# Patient Record
Sex: MALE | Race: BLACK OR AFRICAN AMERICAN | URBAN - METROPOLITAN AREA
[De-identification: names, ages, dates, MRNs, and addresses within clinical notes are randomized per-mention and may not be internally consistent; named-entity substitution may affect disease eponyms.]

---

## 2018-02-07 ENCOUNTER — HOSPITAL ENCOUNTER (OUTPATIENT)
Dept: LAB | Age: 53
Discharge: HOME | End: 2018-02-07
Payer: COMMERCIAL

## 2018-02-07 ENCOUNTER — OFFICE VISIT (OUTPATIENT)
Dept: FAMILY MEDICINE CLINIC | Facility: CLINIC | Age: 53
End: 2018-02-07

## 2018-02-07 VITALS
BODY MASS INDEX: 32.6 KG/M2 | WEIGHT: 246 LBS | RESPIRATION RATE: 15 BRPM | SYSTOLIC BLOOD PRESSURE: 148 MMHG | HEART RATE: 66 BPM | HEIGHT: 73 IN | DIASTOLIC BLOOD PRESSURE: 92 MMHG | TEMPERATURE: 97.5 F | OXYGEN SATURATION: 100 %

## 2018-02-07 DIAGNOSIS — Z23 ENCOUNTER FOR IMMUNIZATION: ICD-10-CM

## 2018-02-07 DIAGNOSIS — E78.5 HYPERLIPIDEMIA, UNSPECIFIED HYPERLIPIDEMIA TYPE: ICD-10-CM

## 2018-02-07 DIAGNOSIS — E11.9 TYPE 2 DIABETES MELLITUS WITHOUT COMPLICATION, WITH LONG-TERM CURRENT USE OF INSULIN (HCC): ICD-10-CM

## 2018-02-07 DIAGNOSIS — Z79.4 TYPE 2 DIABETES MELLITUS WITHOUT COMPLICATION, WITH LONG-TERM CURRENT USE OF INSULIN (HCC): ICD-10-CM

## 2018-02-07 DIAGNOSIS — I10 ESSENTIAL HYPERTENSION: ICD-10-CM

## 2018-02-07 DIAGNOSIS — I10 ESSENTIAL HYPERTENSION: Primary | ICD-10-CM

## 2018-02-07 LAB
ALBUMIN SERPL-MCNC: 4.7 G/DL (ref 3.4–5)
ALBUMIN/GLOB SERPL: 1.6 {RATIO} (ref 0.8–1.7)
ALP SERPL-CCNC: 32 U/L (ref 45–117)
ALT SERPL-CCNC: 38 U/L (ref 16–61)
ANION GAP SERPL CALC-SCNC: 7 MMOL/L (ref 3–18)
AST SERPL-CCNC: 47 U/L (ref 15–37)
BILIRUB SERPL-MCNC: 0.4 MG/DL (ref 0.2–1)
BUN SERPL-MCNC: 20 MG/DL (ref 7–18)
BUN/CREAT SERPL: 14 (ref 12–20)
CALCIUM SERPL-MCNC: 9.5 MG/DL (ref 8.5–10.1)
CHLORIDE SERPL-SCNC: 103 MMOL/L (ref 100–108)
CHOLEST SERPL-MCNC: 97 MG/DL
CO2 SERPL-SCNC: 29 MMOL/L (ref 21–32)
CREAT SERPL-MCNC: 1.45 MG/DL (ref 0.6–1.3)
GLOBULIN SER CALC-MCNC: 3 G/DL (ref 2–4)
GLUCOSE SERPL-MCNC: 117 MG/DL (ref 74–99)
HBA1C MFR BLD: 6.5 % (ref 4.2–5.6)
HDLC SERPL-MCNC: 31 MG/DL (ref 40–60)
HDLC SERPL: 3.1 {RATIO} (ref 0–5)
LDLC SERPL CALC-MCNC: 34.4 MG/DL (ref 0–100)
LIPID PROFILE,FLP: ABNORMAL
POTASSIUM SERPL-SCNC: 4.3 MMOL/L (ref 3.5–5.5)
PROT SERPL-MCNC: 7.7 G/DL (ref 6.4–8.2)
SODIUM SERPL-SCNC: 139 MMOL/L (ref 136–145)
TRIGL SERPL-MCNC: 158 MG/DL (ref ?–150)
VLDLC SERPL CALC-MCNC: 31.6 MG/DL

## 2018-02-07 PROCEDURE — 80053 COMPREHEN METABOLIC PANEL: CPT | Performed by: INTERNAL MEDICINE

## 2018-02-07 PROCEDURE — 36415 COLL VENOUS BLD VENIPUNCTURE: CPT | Performed by: INTERNAL MEDICINE

## 2018-02-07 PROCEDURE — 83036 HEMOGLOBIN GLYCOSYLATED A1C: CPT | Performed by: INTERNAL MEDICINE

## 2018-02-07 PROCEDURE — 80061 LIPID PANEL: CPT | Performed by: INTERNAL MEDICINE

## 2018-02-07 RX ORDER — METFORMIN HYDROCHLORIDE 1000 MG/1
TABLET ORAL
Refills: 1 | COMMUNITY
Start: 2017-12-27 | End: 2018-03-30 | Stop reason: SDUPTHER

## 2018-02-07 RX ORDER — SILDENAFIL CITRATE 50 MG/1
TABLET, FILM COATED ORAL
Refills: 3 | COMMUNITY
Start: 2017-11-04 | End: 2018-05-07

## 2018-02-07 RX ORDER — FENOFIBRIC ACID 135 MG/1
CAPSULE, DELAYED RELEASE ORAL
Refills: 0 | COMMUNITY
Start: 2017-11-09 | End: 2018-02-07 | Stop reason: SDUPTHER

## 2018-02-07 RX ORDER — AMLODIPINE BESYLATE 5 MG/1
5 TABLET ORAL DAILY
Qty: 90 TAB | Refills: 1 | Status: SHIPPED | OUTPATIENT
Start: 2018-02-07 | End: 2018-09-03 | Stop reason: SDUPTHER

## 2018-02-07 RX ORDER — VALSARTAN AND HYDROCHLOROTHIAZIDE 320; 25 MG/1; MG/1
TABLET, FILM COATED ORAL
Refills: 1 | COMMUNITY
Start: 2017-12-02 | End: 2018-03-01 | Stop reason: SDUPTHER

## 2018-02-07 RX ORDER — FENOFIBRIC ACID 135 MG/1
CAPSULE, DELAYED RELEASE ORAL
Qty: 90 CAP | Refills: 1 | Status: SHIPPED | OUTPATIENT
Start: 2018-02-07 | End: 2018-09-03 | Stop reason: SDUPTHER

## 2018-02-07 RX ORDER — GLIPIZIDE 2.5 MG/1
TABLET, EXTENDED RELEASE ORAL
Refills: 1 | COMMUNITY
Start: 2017-12-11 | End: 2018-03-01 | Stop reason: SDUPTHER

## 2018-02-07 RX ORDER — INSULIN GLARGINE 100 [IU]/ML
INJECTION, SOLUTION SUBCUTANEOUS
Refills: 1 | COMMUNITY
Start: 2018-01-05 | End: 2019-02-27 | Stop reason: SDUPTHER

## 2018-02-07 RX ORDER — ROSUVASTATIN CALCIUM 5 MG/1
TABLET, COATED ORAL
Refills: 1 | COMMUNITY
Start: 2017-11-27 | End: 2018-03-01 | Stop reason: SDUPTHER

## 2018-02-07 NOTE — MR AVS SNAPSHOT
[address] [address] 
[phone] Patient: Olivia Ness MRN: [id number] :1965 Visit Information Date & Time Provider Department Dept. Phone Encounter #  
 2018  9:00 AM Arlen Ruiz, [address] [phone] [id number] Follow-up Instructions Return in about 3 months (around 2018) for HTN, DM2, Hyperlipidemia. Upcoming Health Maintenance Date Due DTaP/Tdap/Td series (1 - Tdap) 12/15/1988 Influenza Age 5 to Adult 2017 FOBT Q 1 YEAR AGE 50-75 12/15/2017 Allergies as of 2018  Review Complete On: 2018 By: Hugo Garcia No Known Allergies Current Immunizations  Never Reviewed Name Date Influenza Vaccine (Quad) PF  Incomplete,  Incomplete Not reviewed this visit You Were Diagnosed With   
  
 Codes Comments Essential hypertension    -  Primary ICD-10-CM: I10 
ICD-9-CM: 401.9 Type 2 diabetes mellitus without complication, with long-term current use of insulin (HCC)     ICD-10-CM: E11.9, Z79.4 ICD-9-CM: 250.00, V58.67 Hyperlipidemia, unspecified hyperlipidemia type     ICD-10-CM: E78.5 ICD-9-CM: 272.4 Encounter for immunization     ICD-10-CM: P20 ICD-9-CM: V03.89 BMI 32.0-32.9,adult     ICD-10-CM: L23.04 
ICD-9-CM: V85.32 Vitals BP Pulse Temp Resp Height(growth percentile) Weight(growth percentile) (!) 148/92 (BP 1 Location: Left arm, BP Patient Position: Sitting) 66 97.5 °F (36.4 °C) (Oral) 15 6' 1\" (1.854 m) 246 lb (111.6 kg) SpO2 BMI Smoking Status 100% 32.46 kg/m2 Never Smoker Vitals History BMI and BSA Data Body Mass Index Body Surface Area  
 32.46 kg/m 2 2.4 m 2 Preferred Pharmacy Pharmacy Name Phone [address] , [address] [facility] AT [address] [facility] [address] [phone] Your Updated Medication List  
  
   
This list is accurate as of: 18  9:50 AM.  Always use your most recent med list. amLODIPine 5 mg tablet Commonly known as:  [place] Take 1 Tab by mouth daily. diph,pertuss(acel),tetanus vac(PF) 2 Lf-(2.5-5-3-5 mcg)-5Lf/0.5 mL Syrg vaccine Commonly known as:  ADACEL(TDAP ADOLESN/ADULT)(PF)  
0.5 mL by IntraMUSCular route once for 1 dose. fenofibric acid 135 mg capsule Commonly known as:  TRILIPIX ER  
TK ONE C PO  ONCE A DAY  
  
 glipiZIDE SR 2.5 mg CR tablet Commonly known as:  GLUCOTROL XL  
TK 1 T PO  ONCE A DAY WITH FOOD  
  
 LANTUS SOLOSTAR 100 unit/mL (3 mL) Inpn Generic drug:  insulin glargine INJECT 25 UNITS BENEATH THE SKIN ONCE A DAY  
  
 metFORMIN 1,000 mg tablet Commonly known as:  GLUCOPHAGE  
TK 1 T PO  BID OMEGA 3 PO Take  by mouth. rosuvastatin 5 mg tablet Commonly known as:  CRESTOR TK 1 T PO QD  
  
 valsartan-hydroCHLOROthiazide 320-25 mg per tablet Commonly known as:  DIOVAN-HCT TK 1 T PO  QD  
  
 VIAGRA 50 mg tablet Generic drug:  sildenafil citrate TK 1 TO 2 TS PO D - 30 MINUTES PRIOR TO SEXUAL ACTIVITY Prescriptions Printed Refills diph,pertuss,acel,,tetanus vac,PF, (ADACEL,TDAP ADOLESN/ADULT,,PF,) 2 Lf-(2.5-5-3-5 mcg)-5Lf/0.5 mL syrg vaccine 0 Si.5 mL by IntraMUSCular route once for 1 dose. Class: Print Route: IntraMUSCular Prescriptions Sent to Pharmacy Refills  
 fenofibric acid (TRILIPIX ER) 135 mg capsule 1 Sig: TK ONE C PO  ONCE A DAY Class: Normal  
 Pharmacy: [facility] [address] [place] [address]  AT [address] Ph #: [phone]  
 amLODIPine (NORVASC) 5 mg tablet 1 Sig: Take 1 Tab by mouth daily. Class: Normal  
 Pharmacy: [facility] [id number] [place] [address], [address] [place] [address]  AT [address] Ph #: [phone] Route: Oral  
  
We Performed the Following INFLUENZA VIRUS VAC QUAD,SPLIT,PRESV FREE SYRINGE IM [id number] CPT(R)] INFLUENZA VIRUS VAC QUAD,SPLIT,PRESV FREE SYRINGE IM [id number] CPT(R)] Follow-up Instructions Return in about 3 months (around 5/7/2018) for HTN, DM2, Hyperlipidemia. Patient Instructions Vaccine Information Statement Influenza (Flu) Vaccine (Inactivated or Recombinant): What you need to know Many Vaccine Information Statements are available in Citizen of [place] and other languages. See www.immunize.org/vis Hojas de Información Sobre Vacunas están disponibles en Español y en muchos otros idiomas. Visite www.immunize.org/vis 1. Why get vaccinated? Influenza (flu) is a contagious disease that spreads around the [place] every year, usually between October and May. Flu is caused by influenza viruses, and is spread mainly by coughing, sneezing, and close contact. Anyone can get flu. Flu strikes suddenly and can last several days. Symptoms vary by age, but can include: 
 fever/chills  sore throat  muscle aches  fatigue  cough  headache  runny or stuffy nose Flu can also lead to pneumonia and blood infections, and cause diarrhea and seizures in children. If you have a medical condition, such as heart or lung disease, flu can make it worse. Flu is more dangerous for some people. Infants and young children, people 72years of age and older, pregnant women, and people with certain health conditions or a weakened immune system are at greatest risk. Each year thousands of people in the [facility] die from flu, and many more are hospitalized. Flu vaccine can: 
 keep you from getting flu, 
 make flu less severe if you do get it, and 
 keep you from spreading flu to your family and other people. 2. Inactivated and recombinant flu vaccines A dose of flu vaccine is recommended every flu season.  Children 6 months through 6years of age may need two doses during the same flu season. Everyone else needs only one dose each flu season. Some inactivated flu vaccines contain a very small amount of a mercury-based preservative called thimerosal. Studies have not shown thimerosal in vaccines to be harmful, but flu vaccines that do not contain thimerosal are available. There is no live flu virus in flu shots. They cannot cause the flu. There are many flu viruses, and they are always changing. Each year a new flu vaccine is made to protect against three or four viruses that are likely to cause disease in the upcoming flu season. But even when the vaccine doesnt exactly match these viruses, it may still provide some protection Flu vaccine cannot prevent: 
 flu that is caused by a virus not covered by the vaccine, or 
 illnesses that look like flu but are not. It takes about 2 weeks for protection to develop after vaccination, and protection lasts through the flu season. 3. Some people should not get this vaccine Tell the person who is giving you the vaccine:  If you have any severe, life-threatening allergies. If you ever had a life-threatening allergic reaction after a dose of flu vaccine, or have a severe allergy to any part of this vaccine, you may be advised not to get vaccinated. Most, but not all, types of flu vaccine contain a small amount of egg protein.  If you ever had Guillain-Barré Syndrome (also called GBS). Some people with a history of GBS should not get this vaccine. This should be discussed with your doctor.  If you are not feeling well. It is usually okay to get flu vaccine when you have a mild illness, but you might be asked to come back when you feel better. 4. Risks of a vaccine reaction With any medicine, including vaccines, there is a chance of reactions. These are usually mild and go away on their own, but serious reactions are also possible. Most people who get a flu shot do not have any problems with it. Minor problems following a flu shot include:  
 soreness, redness, or swelling where the shot was given  hoarseness  sore, red or itchy eyes  cough  fever  aches  headache  itching  fatigue If these problems occur, they usually begin soon after the shot and last 1 or 2 days. More serious problems following a flu shot can include the following:  There may be a small increased risk of Guillain-Barré Syndrome (GBS) after inactivated flu vaccine. This risk has been estimated at 1 or 2 additional cases per million people vaccinated. This is much lower than the risk of severe complications from flu, which can be prevented by flu vaccine.  Young children who get the flu shot along with pneumococcal vaccine (PCV13) and/or DTaP vaccine at the same time might be slightly more likely to have a seizure caused by fever. Ask your doctor for more information. Tell your doctor if a child who is getting flu vaccine has ever had a seizure. Problems that could happen after any injected vaccine:  People sometimes faint after a medical procedure, including vaccination. Sitting or lying down for about 15 minutes can help prevent fainting, and injuries caused by a fall. Tell your doctor if you feel dizzy, or have vision changes or ringing in the ears.  Some people get severe pain in the shoulder and have difficulty moving the arm where a shot was given. This happens very rarely.  Any medication can cause a severe allergic reaction. Such reactions from a vaccine are very rare, estimated at about 1 in a million doses, and would happen within a few minutes to a few hours after the vaccination. As with any medicine, there is a very remote chance of a vaccine causing a serious injury or death. The safety of vaccines is always being monitored.  For more information, visit: www.cdc.gov/vaccinesafety/ 
 
 5. What if there is a serious reaction? What should I look for?  Look for anything that concerns you, such as signs of a severe allergic reaction, very high fever, or unusual behavior. Signs of a severe allergic reaction can include hives, swelling of the face and throat, difficulty breathing, a fast heartbeat, dizziness, and weakness  usually within a few minutes to a few hours after the vaccination. What should I do?  If you think it is a severe allergic reaction or other emergency that cant wait, call 9-1-1 and get the person to the nearest hospital. Otherwise, call your doctor.  Reactions should be reported to the Vaccine Adverse Event Reporting System (VAERS). Your doctor should file this report, or you can do it yourself through  the VAERS web site at www.vaers. [facility].gov, or by calling 9-925.395.7051. VAERS does not give medical advice. 6. The National Vaccine Injury Compensation Program 
 
The [facility] Vaccine Injury Compensation Program (VICP) is a federal program that was created to compensate people who may have been injured by certain vaccines. Persons who believe they may have been injured by a vaccine can learn about the program and about filing a claim by calling 9-858.272.8560 or visiting the [address] [place] [address] website at www.[facility].gov/vaccinecompensation. There is a time limit to file a claim for compensation. 7. How can I learn more?  Ask your healthcare provider. He or she can give you the vaccine package insert or suggest other sources of information.  Call your local or state health department.  Contact the Centers for Disease Control and Prevention (CDC): 
- Call 5-579.660.6671 (1-800-CDC-INFO) or 
- Visit CDCs website at www.cdc.gov/flu Vaccine Information Statement Inactivated Influenza Vaccine 8/7/2015 
42 LUZ Russ [id number] Department of [facility] and [employer] Centers for Disease Control and Prevention Office Use Only Introducing [facility]! [employer] introduces [employer] patient portal. Now you can access parts of your medical record, email your doctor's office, and request medication refills online. 1. In your internet browser, go to [email or web address] 2. Click on the First Time User? Click Here link in the Sign In box. You will see the New Member Sign Up page. 3. Enter your Kale[employer]oscope Access Code exactly as it appears below. You will not need to use this code after youve completed the sign-up process. If you do not sign up before the expiration date, you must request a new code. · K[employer]oscope Access Code: [email or web address] Expires: 5/8/2018  9:50 AM 
 
4. Enter the last four digits of your Social Security Number (xxxx) and Date of Birth (mm/dd/yyyy) as indicated and click Submit. You will be taken to the next sign-up page. 5. Create a Kaleidoscope ID. This will be your Kaleidoscope login ID and cannot be changed, so think of one that is secure and easy to remember. 6. Create a Kale[employer]oscope password. You can change your password at any time. 7. Enter your Password Reset Question and Answer. This can be used at a later time if you forget your password. 8. Enter your e-mail address. You will receive e-mail notification when new information is available in [address] 9. Click Sign Up. You can now view and download portions of your medical record. 10. Click the Download Summary menu link to download a portable copy of your medical information. If you have questions, please visit the Frequently Asked Questions section of the K[employer]oscope website. Remember, Kaleidoscope is NOT to be used for urgent needs. For medical emergencies, dial 911. Now available from your iPhone and Android! Please provide this summary of care documentation to your next provider. Your primary care clinician is listed as Kenn Aschoff If you have any questions after today's visit, please call [phone].

## 2018-02-07 NOTE — PATIENT INSTRUCTIONS
Vaccine Information Statement    Influenza (Flu) Vaccine (Inactivated or Recombinant): What you need to know    Many Vaccine Information Statements are available in [place] and other languages. See www.immunize.org/vis  Hojas de Información Sobre Vacunas están disponibles en Español y en muchos otros idiomas. Visite www.immunize.org/vis    1. Why get vaccinated? Influenza (flu) is a contagious disease that spreads around the [place] every year, usually between October and May. Flu is caused by influenza viruses, and is spread mainly by coughing, sneezing, and close contact. Anyone can get flu. Flu strikes suddenly and can last several days. Symptoms vary by age, but can include:   fever/chills   sore throat   muscle aches   fatigue   cough   headache    runny or stuffy nose    Flu can also lead to pneumonia and blood infections, and cause diarrhea and seizures in children. If you have a medical condition, such as heart or lung disease, flu can make it worse. Flu is more dangerous for some people. Infants and young children, people 72years of age and older, pregnant women, and people with certain health conditions or a weakened immune system are at greatest risk. Each year thousands of people in the [facility] die from flu, and many more are hospitalized. Flu vaccine can:   keep you from getting flu,   make flu less severe if you do get it, and   keep you from spreading flu to your family and other people. 2. Inactivated and recombinant flu vaccines    A dose of flu vaccine is recommended every flu season. Children 6 months through 6years of age may need two doses during the same flu season. Everyone else needs only one dose each flu season.        Some inactivated flu vaccines contain a very small amount of a mercury-based preservative called thimerosal. Studies have not shown thimerosal in vaccines to be harmful, but flu vaccines that do not contain thimerosal are available. There is no live flu virus in flu shots. They cannot cause the flu. There are many flu viruses, and they are always changing. Each year a new flu vaccine is made to protect against three or four viruses that are likely to cause disease in the upcoming flu season. But even when the vaccine doesnt exactly match these viruses, it may still provide some protection    Flu vaccine cannot prevent:   flu that is caused by a virus not covered by the vaccine, or   illnesses that look like flu but are not. It takes about 2 weeks for protection to develop after vaccination, and protection lasts through the flu season. 3. Some people should not get this vaccine    Tell the person who is giving you the vaccine:     If you have any severe, life-threatening allergies. If you ever had a life-threatening allergic reaction after a dose of flu vaccine, or have a severe allergy to any part of this vaccine, you may be advised not to get vaccinated. Most, but not all, types of flu vaccine contain a small amount of egg protein.  If you ever had Guillain-Barré Syndrome (also called GBS). Some people with a history of GBS should not get this vaccine. This should be discussed with your doctor.  If you are not feeling well. It is usually okay to get flu vaccine when you have a mild illness, but you might be asked to come back when you feel better. 4. Risks of a vaccine reaction    With any medicine, including vaccines, there is a chance of reactions. These are usually mild and go away on their own, but serious reactions are also possible. Most people who get a flu shot do not have any problems with it.      Minor problems following a flu shot include:    soreness, redness, or swelling where the shot was given     hoarseness   sore, red or itchy eyes   cough   fever   aches   headache   itching   fatigue  If these problems occur, they usually begin soon after the shot and last 1 or 2 days. More serious problems following a flu shot can include the following:     There may be a small increased risk of Guillain-Barré Syndrome (GBS) after inactivated flu vaccine. This risk has been estimated at 1 or 2 additional cases per million people vaccinated. This is much lower than the risk of severe complications from flu, which can be prevented by flu vaccine.  Young children who get the flu shot along with pneumococcal vaccine (PCV13) and/or DTaP vaccine at the same time might be slightly more likely to have a seizure caused by fever. Ask your doctor for more information. Tell your doctor if a child who is getting flu vaccine has ever had a seizure. Problems that could happen after any injected vaccine:      People sometimes faint after a medical procedure, including vaccination. Sitting or lying down for about 15 minutes can help prevent fainting, and injuries caused by a fall. Tell your doctor if you feel dizzy, or have vision changes or ringing in the ears.  Some people get severe pain in the shoulder and have difficulty moving the arm where a shot was given. This happens very rarely.  Any medication can cause a severe allergic reaction. Such reactions from a vaccine are very rare, estimated at about 1 in a million doses, and would happen within a few minutes to a few hours after the vaccination. As with any medicine, there is a very remote chance of a vaccine causing a serious injury or death. The safety of vaccines is always being monitored. For more information, visit: www.cdc.gov/vaccinesafety/    5. What if there is a serious reaction? What should I look for?  Look for anything that concerns you, such as signs of a severe allergic reaction, very high fever, or unusual behavior.     Signs of a severe allergic reaction can include hives, swelling of the face and throat, difficulty breathing, a fast heartbeat, dizziness, and weakness  usually within a few minutes to a few hours after the vaccination. What should I do?  If you think it is a severe allergic reaction or other emergency that cant wait, call 9-1-1 and get the person to the nearest hospital. Otherwise, call your doctor.  Reactions should be reported to the Vaccine Adverse Event Reporting System (VAERS). Your doctor should file this report, or you can do it yourself through  the VAERS web site at www.vaers. [facility].gov, or by calling 3-391.821.7389. VAERS does not give medical advice. 6. The National Vaccine Injury Compensation Program    The [facility] Vaccine Injury Compensation Program (VICP) is a federal program that was created to compensate people who may have been injured by certain vaccines. Persons who believe they may have been injured by a vaccine can learn about the program and about filing a claim by calling 2-959.927.2715 or visiting the [employer] website at www.[facility].gov/vaccinecompensation. There is a time limit to file a claim for compensation. 7. How can I learn more?  Ask your healthcare provider. He or she can give you the vaccine package insert or suggest other sources of information.  Call your local or state health department.  Contact the Centers for Disease Control and Prevention (CDC):  - Call 6-924.152.6483 (1-800-CDC-INFO) or  - Visit CDCs website at www.cdc.gov/flu    Vaccine Information Statement   Inactivated Influenza Vaccine   8/7/2015  42 URenetta Hinton Sides [id number]    Department of Health and Human Services  Centers for Disease Control and Prevention    Office Use Only

## 2018-02-07 NOTE — PROGRESS NOTES
Emily Hall is a 50 y.o.  male presents today for office visit for diabetes mellitus, hyperlipidemia and hypertension. Pt is not fasting. Pt is in Room # 4.    1. Have you been to the ER, urgent care clinic? Hospitalized? No    2. Have you seen or consulted any other health care providers outside of the [address] [place] [address] ? Include any pap smears or colon screening. Yes Colonoscopy 9/2017 [address] [place] [address] Maintenance reviewed. Will obtain the records from [address]:  Orders Placed This Encounter    Influenza virus vaccine (QUADRIVALENT PRES FREE SYRINGE)  ([address]342)    REFERRAL TO OPHTHALMOLOGY    REFERRAL TO [address] [place] SARGENT MD/Ml Rucker      Emily Hall is a 50 y.o. male who presents for routine immunizations. He denies any symptoms , reactions or allergies that would exclude them from being immunized today. Risks and adverse reactions were discussed and the VIS was given to them. All questions were addressed. He was observed for 10 min post injection. There were no reactions observed.     [address]

## 2018-02-07 NOTE — PROGRESS NOTES
History:   Joy Allen is a 50 y.o. male presenting today for an initial visit for Establish Care; Diabetes; Hypertension; and Cholesterol Problem    Pt presents today to establish care. History is significant for HTN, DM2, Hypercholesterolemia, and gout. He was previously followed by NP Mallorie Clement for primary care. He reports doing well since being seen by his last pcp. He reports consuming a healthy diet and exercising most days of the week. HTN: He currently takes Diovan HCT. He states that he was also prescribed amlodipine, but has not taken it in 2 months since his prescription ran out. Today BP is 148/92. He denies headache, dizziness, SOB, chest pain, palpitations, orthopnea, pnd, or leg swelling. DM Type 2: Controlled on current regimen of glipizide, metformin, and Lantus. Last A1c 7.0 8/23/17. Home readings have averaged  fasting with postprandial readings of 120-140. He denies blurred vision, polyuria, polydipsia, n/v, or abdominal pain. He is not up to date with DM eye and foot exams. Hypercholesterolemia: Currently taking Crestor and fenofibric acid without adverse effect. H/o gout: No recent flareups. Pt is aware of what foods to avoid to help prevent attacks. Past Medical History:   Diagnosis Date    Diabetes ([address] 01/01/2016    Type II    Gout 01/01/2008    Hypercholesterolemia     Hypertension        History reviewed. No pertinent surgical history. Social History     Social History    Marital status:      Spouse name: N/A    Number of children: N/A    Years of education: 25     Occupational History     Other     Social History Main Topics    Smoking status: Never Smoker    Smokeless tobacco: Never Used    Alcohol use Yes      Comment: rarely    Drug use: No    Sexual activity: Yes     Partners: Female     Birth control/ protection: Condom     Other Topics Concern     Service Yes     [employer]    Blood Transfusions No    Caffeine Concern No    Occupational Exposure No    Hobby Hazards No    Sleep Concern No    Stress Concern No    Weight Concern No    Special Diet Yes    Back Care No    Exercise Yes     6X week    Bike Helmet No    Seat Belt Yes    Self-Exams No     Social History Narrative    No narrative on file       Family History   Problem Relation Age of Onset    Cancer Mother 54    Diabetes Father      Type II    No Known Problems Sister     No Known Problems Maternal Grandmother     Heart Attack Maternal Grandfather     Diabetes Paternal Grandmother     Diabetes Paternal Grandfather     Other Daughter     Other Daughter        No current outpatient prescriptions on file prior to visit. No current facility-administered medications on file prior to visit. No Known Allergies    Review of Systems   Constitutional: Negative. HENT: Negative. Eyes: Negative. Respiratory: Negative. Cardiovascular: Negative. Gastrointestinal: Negative. Genitourinary: Negative. Musculoskeletal: Negative. Skin: Negative. Neurological: Negative. Endo/Heme/Allergies: Negative. Psychiatric/Behavioral: Negative. Objective:   VS:    Visit Vitals    BP (!) 148/92 (BP 1 Location: Left arm, BP Patient Position: Sitting)  Comment: manual    Pulse 66    Temp 97.5 °F (36.4 °C) (Oral)    Resp 15    Ht 6' 1\" (1.854 m)    Wt 246 lb (111.6 kg)    SpO2 100%    BMI 32.46 kg/m2     Physical Exam   Constitutional: He is oriented to person, place, and time. He appears well-developed and well-nourished. HENT:   Head: Normocephalic and atraumatic. Right Ear: External ear normal.   Left Ear: External ear normal.   Nose: Nose normal.   Mouth/Throat: Oropharynx is clear and moist.   Eyes: Conjunctivae and EOM are normal. Pupils are equal, round, and reactive to light. Neck: Normal range of motion. Neck supple.    Cardiovascular: Normal rate, regular rhythm, normal heart sounds and intact distal pulses. Pulmonary/Chest: Effort normal and breath sounds normal.   Abdominal: Soft. Bowel sounds are normal.   Musculoskeletal: He exhibits no edema. Neurological: He is alert and oriented to person, place, and time. Skin: Skin is warm and dry. Psychiatric: He has a normal mood and affect. His behavior is normal.   Nursing note and vitals reviewed. Assessment/ Plan:     Diagnoses and all orders for this visit:    1. Essential hypertension        -     Hasn't been taking amlodipine in last 2 months so will add back to regimen for better control  -     amLODIPine (NORVASC) 5 mg tablet; Take 1 Tab by mouth daily.  -     METABOLIC PANEL, COMPREHENSIVE; Future    2. Type 2 diabetes mellitus without complication, with long-term current use of insulin (HCC)        -     Controlled. Last A1c 7.0        -     Continue current regimen  -     REFERRAL TO OPHTHALMOLOGY  -     METABOLIC PANEL, COMPREHENSIVE; Future  -     HEMOGLOBIN A1C W/O EAG; Future  -     REFERRAL TO PODIATRY    3. Hyperlipidemia, unspecified hyperlipidemia type  -     fenofibric acid (TRILIPIX ER) 135 mg capsule; TK ONE C PO  ONCE A DAY  -     LIPID PANEL; Future    4. Encounter for immunization  -     Influenza virus vaccine (QUADRIVALENT PRES FREE SYRINGE) IM ([id number])  -     diph,pertuss,acel,,tetanus vac,PF, (ADACEL,TDAP ADOLESN/ADULT,,PF,) 2 Lf-(2.5-5-3-5 mcg)-5Lf/0.5 mL syrg vaccine; 0.5 mL by IntraMUSCular route once for 1 dose. 5. BMI 32.0-32.9,adult        -     Continue efforts with lifestyle modification      Health maintenance: Pt reports having colonoscopy last year with Dr. Jacques Humphrey and reports finding of one polyp. PSA screening 0.6 on 8/23/17   I have discussed the diagnosis with the patient and the intended plan as seen in the above orders. The patient verbalized understanding and agrees with the plan.       Follow-up Disposition: Not on [address] [facility], MD

## 2018-03-01 DIAGNOSIS — E78.5 HYPERLIPIDEMIA, UNSPECIFIED HYPERLIPIDEMIA TYPE: Primary | ICD-10-CM

## 2018-03-01 DIAGNOSIS — Z79.4 CONTROLLED TYPE 2 DIABETES MELLITUS WITHOUT COMPLICATION, WITH LONG-TERM CURRENT USE OF INSULIN (HCC): ICD-10-CM

## 2018-03-01 DIAGNOSIS — I10 ESSENTIAL HYPERTENSION: ICD-10-CM

## 2018-03-01 DIAGNOSIS — E11.9 CONTROLLED TYPE 2 DIABETES MELLITUS WITHOUT COMPLICATION, WITH LONG-TERM CURRENT USE OF INSULIN (HCC): ICD-10-CM

## 2018-03-01 RX ORDER — ROSUVASTATIN CALCIUM 5 MG/1
TABLET, COATED ORAL
Qty: 90 TAB | Refills: 1 | Status: SHIPPED | OUTPATIENT
Start: 2018-03-01 | End: 2018-11-09 | Stop reason: SDUPTHER

## 2018-03-01 RX ORDER — VALSARTAN AND HYDROCHLOROTHIAZIDE 320; 25 MG/1; MG/1
TABLET, FILM COATED ORAL
Qty: 90 TAB | Refills: 1 | Status: SHIPPED | OUTPATIENT
Start: 2018-03-01 | End: 2018-08-01 | Stop reason: ALTCHOICE

## 2018-03-01 RX ORDER — GLIPIZIDE 2.5 MG/1
TABLET, EXTENDED RELEASE ORAL
Qty: 90 TAB | Refills: 1 | Status: SHIPPED | OUTPATIENT
Start: 2018-03-01 | End: 2018-10-04 | Stop reason: SDUPTHER

## 2018-03-30 DIAGNOSIS — E11.9 TYPE 2 DIABETES MELLITUS WITHOUT COMPLICATION, WITH LONG-TERM CURRENT USE OF INSULIN (HCC): Primary | ICD-10-CM

## 2018-03-30 DIAGNOSIS — Z79.4 TYPE 2 DIABETES MELLITUS WITHOUT COMPLICATION, WITH LONG-TERM CURRENT USE OF INSULIN (HCC): Primary | ICD-10-CM

## 2018-03-30 RX ORDER — METFORMIN HYDROCHLORIDE 1000 MG/1
TABLET ORAL
Qty: 180 TAB | Refills: 1 | Status: SHIPPED | OUTPATIENT
Start: 2018-03-30 | End: 2018-10-04 | Stop reason: SDUPTHER

## 2018-05-07 ENCOUNTER — OFFICE VISIT (OUTPATIENT)
Dept: FAMILY MEDICINE CLINIC | Facility: CLINIC | Age: 53
End: 2018-05-07

## 2018-05-07 ENCOUNTER — HOSPITAL ENCOUNTER (OUTPATIENT)
Dept: LAB | Age: 53
Discharge: HOME | End: 2018-05-07
Payer: COMMERCIAL

## 2018-05-07 VITALS
TEMPERATURE: 97.9 F | HEART RATE: 63 BPM | WEIGHT: 245 LBS | SYSTOLIC BLOOD PRESSURE: 134 MMHG | RESPIRATION RATE: 13 BRPM | OXYGEN SATURATION: 98 % | DIASTOLIC BLOOD PRESSURE: 83 MMHG | BODY MASS INDEX: 32.47 KG/M2 | HEIGHT: 73 IN

## 2018-05-07 DIAGNOSIS — I10 ESSENTIAL HYPERTENSION: ICD-10-CM

## 2018-05-07 DIAGNOSIS — Z79.4 TYPE 2 DIABETES MELLITUS WITHOUT COMPLICATION, WITH LONG-TERM CURRENT USE OF INSULIN (HCC): Primary | ICD-10-CM

## 2018-05-07 DIAGNOSIS — Z23 ENCOUNTER FOR IMMUNIZATION: ICD-10-CM

## 2018-05-07 DIAGNOSIS — Z79.4 TYPE 2 DIABETES MELLITUS WITHOUT COMPLICATION, WITH LONG-TERM CURRENT USE OF INSULIN (HCC): ICD-10-CM

## 2018-05-07 DIAGNOSIS — E11.9 TYPE 2 DIABETES MELLITUS WITHOUT COMPLICATION, WITH LONG-TERM CURRENT USE OF INSULIN (HCC): ICD-10-CM

## 2018-05-07 DIAGNOSIS — E78.5 HYPERLIPIDEMIA, UNSPECIFIED HYPERLIPIDEMIA TYPE: ICD-10-CM

## 2018-05-07 DIAGNOSIS — E11.9 TYPE 2 DIABETES MELLITUS WITHOUT COMPLICATION, WITH LONG-TERM CURRENT USE OF INSULIN (HCC): Primary | ICD-10-CM

## 2018-05-07 LAB
ALBUMIN SERPL-MCNC: 4.5 G/DL (ref 3.4–5)
ALBUMIN/GLOB SERPL: 1.5 {RATIO} (ref 0.8–1.7)
ALP SERPL-CCNC: 33 U/L (ref 45–117)
ALT SERPL-CCNC: 32 U/L (ref 16–61)
ANION GAP SERPL CALC-SCNC: 12 MMOL/L (ref 3–18)
AST SERPL-CCNC: 35 U/L (ref 15–37)
BILIRUB SERPL-MCNC: 0.3 MG/DL (ref 0.2–1)
BUN SERPL-MCNC: 17 MG/DL (ref 7–18)
BUN/CREAT SERPL: 12 (ref 12–20)
CALCIUM SERPL-MCNC: 9 MG/DL (ref 8.5–10.1)
CHLORIDE SERPL-SCNC: 102 MMOL/L (ref 100–108)
CO2 SERPL-SCNC: 31 MMOL/L (ref 21–32)
CREAT SERPL-MCNC: 1.41 MG/DL (ref 0.6–1.3)
CREAT UR-MCNC: 133.07 MG/DL (ref 30–125)
GLOBULIN SER CALC-MCNC: 3.1 G/DL (ref 2–4)
GLUCOSE SERPL-MCNC: 119 MG/DL (ref 74–99)
HBA1C MFR BLD: 6.5 % (ref 4.2–5.6)
MICROALBUMIN UR-MCNC: 0.8 MG/DL (ref 0–3)
MICROALBUMIN/CREAT UR-RTO: 6 MG/G (ref 0–30)
POTASSIUM SERPL-SCNC: 3.7 MMOL/L (ref 3.5–5.5)
PROT SERPL-MCNC: 7.6 G/DL (ref 6.4–8.2)
SODIUM SERPL-SCNC: 145 MMOL/L (ref 136–145)
TSH SERPL DL<=0.05 MIU/L-ACNC: 2.25 UIU/ML (ref 0.36–3.74)

## 2018-05-07 PROCEDURE — 83036 HEMOGLOBIN GLYCOSYLATED A1C: CPT | Performed by: INTERNAL MEDICINE

## 2018-05-07 PROCEDURE — 36415 COLL VENOUS BLD VENIPUNCTURE: CPT | Performed by: INTERNAL MEDICINE

## 2018-05-07 PROCEDURE — 82043 UR ALBUMIN QUANTITATIVE: CPT | Performed by: INTERNAL MEDICINE

## 2018-05-07 PROCEDURE — 84443 ASSAY THYROID STIM HORMONE: CPT | Performed by: INTERNAL MEDICINE

## 2018-05-07 PROCEDURE — 80053 COMPREHEN METABOLIC PANEL: CPT | Performed by: INTERNAL MEDICINE

## 2018-05-07 NOTE — PROGRESS NOTES
Subjective:   Abraham Mirza is a 50 y.o.  male who presents for follow up of HTN and DM2. HTN: BP has improved since last visit. Today BP is 134/83. Pt is currently taking Diovan HCT and amlodipine 5 mg daily. He is making efforts with DASH diet. He denies headache, dizziness, SOB, chest pain, palpitations, orthopnea, pnd, or leg swelling. DM Type 2: Controlled on current regimen of metformin, glipizide SR and Lantus. Home readings have averaged  fasting and 130 postprandial.  He denies hypoglycemia, blurred vision, n/v, abdominal pain, polyuria, or polydipsia. Hyperlipidemia: He is taking rosuvastatin, fenofibric acid, and omega-3 supplement. LDL is wnl, HDL is not at goal.    Elevated BMI:  BMI is 32.32. Pt is making efforts with lifestyle modification including healthy diet and regular exercise. Review of Systems   Constitutional: Negative. HENT: Negative. Eyes: Negative. Respiratory: Negative. Cardiovascular: Negative. Gastrointestinal: Negative. Genitourinary: Negative. Musculoskeletal: Negative. Skin: Negative. Neurological: Negative. Endo/Heme/Allergies: Negative. Psychiatric/Behavioral: Negative. Current Outpatient Prescriptions on File Prior to Visit   Medication Sig Dispense Refill    metFORMIN (GLUCOPHAGE) 1,000 mg tablet TK 1 T PO   Tab 1    valsartan-hydroCHLOROthiazide (DIOVAN-HCT) 320-25 mg per tablet TK 1 T PO  QD 90 Tab 1    glipiZIDE SR (GLUCOTROL XL) 2.5 mg CR tablet TK 1 T PO  ONCE A DAY WITH FOOD 90 Tab 1    rosuvastatin (CRESTOR) 5 mg tablet TK 1 T PO QD 90 Tab 1    LANTUS SOLOSTAR 100 unit/mL (3 mL) inpn INJECT 25 UNITS BENEATH THE SKIN ONCE A DAY  1    FLAXSEED OIL (OMEGA 3 PO) Take  by mouth.  fenofibric acid (TRILIPIX ER) 135 mg capsule TK ONE C PO  ONCE A DAY 90 Cap 1    amLODIPine (NORVASC) 5 mg tablet Take 1 Tab by mouth daily.  90 Tab 1    VIAGRA 50 mg tablet TK 1 TO 2 TS PO D - 30 MINUTES PRIOR TO SEXUAL ACTIVITY  3     No current facility-administered medications on file prior to visit. Reviewed PmHx, RxHx, FmHx, SocHx, AllgHx and updated and dated in the chart. Nurse notes were reviewed and are correct    Objective:     Vitals:    05/07/18 0909   BP: 134/83   Pulse: 63   Resp: 13   Temp: 97.9 °F (36.6 °C)   TempSrc: Oral   SpO2: 98%   Weight: 245 lb (111.1 kg)   Height: 6' 1\" (1.854 m)     Physical Exam   Constitutional: He is oriented to person, place, and time. He appears well-developed and well-nourished. HENT:   Head: Normocephalic and atraumatic. Mouth/Throat: Oropharynx is clear and moist.   Eyes: Conjunctivae and EOM are normal. Pupils are equal, round, and reactive to light. Neck: Normal range of motion. Neck supple. Cardiovascular: Normal rate, regular rhythm, normal heart sounds and intact distal pulses. Pulmonary/Chest: Effort normal and breath sounds normal.   Abdominal: Soft. Bowel sounds are normal.   Musculoskeletal: Normal range of motion. He exhibits no edema. Neurological: He is alert and oriented to person, place, and time. Skin: Skin is warm and dry. Nursing note and vitals reviewed. Assessment/ Plan:     Diagnoses and all orders for this visit:    1. Type 2 diabetes mellitus without complication, with long-term current use of insulin (HCC)  -     HEMOGLOBIN A1C W/O EAG; Future  -     METABOLIC PANEL, COMPREHENSIVE; Future  -     MICROALBUMIN, UR, RAND W/ MICROALB/CREAT RATIO; Future    2. Essential hypertension  -     METABOLIC PANEL, COMPREHENSIVE; Future    3. Hyperlipidemia, unspecified hyperlipidemia type        -     Continue current medications and efforts with lifestyle modification    4. BMI 32.0-32.9,adult  -     TSH 3RD GENERATION; Future  -     Counseled on lifestyle modification    5. Encounter for immunization  -     pneumococcal 23-valent (PNEUMOVAX 23) 25 mcg/0.5 mL injection; 0.5 mL by IntraMUSCular route once for 1 dose.        I have discussed the diagnosis with the patient and the intended plan as seen in the above orders. The patient verbalized understanding and agrees with the plan.     Follow-up Disposition: Not on [address] [facility], MD

## 2018-05-07 NOTE — MR AVS SNAPSHOT
[address] [facility] 
 
 
 [address] [facility] [phone] [id number] 
[phone] Patient: Estephanie Perkins MRN: [id number] :1965 Visit Information Date & Time Provider Department Dept. Phone Encounter #  
 2018  9:00 AM JERMAN Urbina [facility] [phone] [id number] Follow-up Instructions Return in about 3 months (around 2018) for HTN, DM2, elevated BMI. Your Appointments 2018  9:30 AM  
Follow Up with Robin Tyler MD  
[facility]) Appt Note: Return in about 3 months (around 2018) for HTN, DM2.  
 [address] [place] [phone]  
[address] [place] [address] Upcoming Health Maintenance Date Due MICROALBUMIN Q1 12/15/1977 EYE EXAM RETINAL OR DILATED Q1 12/15/1977 Pneumococcal 19-64 Medium Risk (1 of 1 - PPSV23) 12/15/1986 DTaP/Tdap/Td series (1 - Tdap) 12/15/1988 FOBT Q 1 YEAR AGE 50-75 2018* Influenza Age 5 to Adult 2018 HEMOGLOBIN A1C Q6M 2018 LIPID PANEL Q1 2019 FOOT EXAM Q1 2019 *Topic was postponed. The date shown is not the original due date. Allergies as of 2018  Review Complete On: 2018 By: Triny Herron LPN No Known Allergies Current Immunizations  Reviewed on 2018 Name Date Influenza Vaccine (Quad) PF 2018 10:00 AM  
  
 Not reviewed this visit You Were Diagnosed With   
  
 Codes Comments Type 2 diabetes mellitus without complication, with long-term current use of insulin (HCC)    -  Primary ICD-10-CM: E11.9, Z79.4 ICD-9-CM: 250.00, V58.67 Essential hypertension     ICD-10-CM: I10 
ICD-9-CM: 401.9 Hyperlipidemia, unspecified hyperlipidemia type     ICD-10-CM: E78.5 ICD-9-CM: 272.4 BMI 32.0-32.9,adult     ICD-10-CM: X11.56 
ICD-9-CM: V85.32  Encounter for immunization     ICD-10-CM: L52 
 ICD-9-CM: V03.89 Vitals BP Pulse Temp Resp Height(growth percentile) Weight(growth percentile) 134/83 (BP 1 Location: Left arm, BP Patient Position: Sitting) 63 97.9 °F (36.6 °C) (Oral) 13 6' 1\" (1.854 m) 245 lb (111.1 kg) SpO2 BMI Smoking Status 98% 32.32 kg/m2 Never Smoker Vitals History BMI and BSA Data Body Mass Index Body Surface Area  
 32.32 kg/m 2 2.39 m 2 Preferred Pharmacy Pharmacy Name Phone [facility] [facility], [address] [address] [facility] [address] [phone] Your Updated Medication List  
  
   
This list is accurate as of 18 11:29 AM.  Always use your most recent med list. amLODIPine 5 mg tablet Commonly known as:  Alysa Jose C Take 1 Tab by mouth daily. fenofibric acid 135 mg capsule Commonly known as:  TRILIPIX ER  
TK ONE C PO  ONCE A DAY  
  
 glipiZIDE SR 2.5 mg CR tablet Commonly known as:  GLUCOTROL XL  
TK 1 T PO  ONCE A DAY WITH FOOD  
  
 LANTUS SOLOSTAR U-100 INSULIN 100 unit/mL (3 mL) Inpn Generic drug:  insulin glargine INJECT 25 UNITS BENEATH THE SKIN ONCE A DAY  
  
 metFORMIN 1,000 mg tablet Commonly known as:  GLUCOPHAGE  
TK 1 T PO  BID OMEGA 3 PO Take  by mouth.  
  
 pneumococcal 23-valent 25 mcg/0.5 mL injection Commonly known as:  PNEUMOVAX 23  
0.5 mL by IntraMUSCular route once for 1 dose. rosuvastatin 5 mg tablet Commonly known as:  CRESTOR TK 1 T PO QD  
  
 valsartan-hydroCHLOROthiazide 320-25 mg per tablet Commonly known as:  DIOVAN-HCT TK 1 T PO  QD  
  
 VIAGRA 50 mg tablet Generic drug:  sildenafil citrate TK 1 TO 2 TS PO D - 30 MINUTES PRIOR TO SEXUAL ACTIVITY Prescriptions Printed Refills  
 pneumococcal 23-valent (PNEUMOVAX 23) 25 mcg/0.5 mL injection 0 Si.5 mL by IntraMUSCular route once for 1 dose. Class: Print Route: IntraMUSCular Follow-up Instructions Return in about 3 months (around 8/7/2018) for HTN, DM2, elevated BMI. Introducing [facility]! Anabell Dunbar introduces [employer] patient portal. Now you can access parts of your medical record, email your doctor's office, and request medication refills online. 1. In your internet browser, go to [email or web address] 2. Click on the First Time User? Click Here link in the Sign In box. You will see the New Member Sign Up page. 3. Enter your [employer] Access Code exactly as it appears below. You will not need to use this code after youve completed the sign-up process. If you do not sign up before the expiration date, you must request a new code. · [employer] Access Code: [email or web address] Expires: 5/8/2018 10:50 AM 
 
4. Enter the last four digits of your Social Security Number (xxxx) and Date of Birth (mm/dd/yyyy) as indicated and click Submit. You will be taken to the next sign-up page. 5. Create a [employer] ID. This will be your [employer] login ID and cannot be changed, so think of one that is secure and easy to remember. 6. Create a [employer] password. You can change your password at any time. 7. Enter your Password Reset Question and Answer. This can be used at a later time if you forget your password. 8. Enter your e-mail address. You will receive e-mail notification when new information is available in [address] 9. Click Sign Up. You can now view and download portions of your medical record. 10. Click the Download Summary menu link to download a portable copy of your medical information. If you have questions, please visit the Frequently Asked Questions section of the [employer] website. Remember, [employer] is NOT to be used for urgent needs. For medical emergencies, dial 911. Now available from your iPhone and Android! Please provide this summary of care documentation to your next provider. Your primary care clinician is listed as Griffin Montoya If you have any questions after today's visit, please call [phone].

## 2018-05-07 NOTE — PROGRESS NOTES
Davis Falcon is a 50 y.o.@ presents today for office visit for follow up. Pt is in Room # 4.     1. Have you been to the ER, urgent care clinic since your last visit? Hospitalized since your last visit? No    2. Have you seen or consulted any other health care providers outside of the [facility] since your last visit? Include any pap smears or colon screening. No         Health Maintenance reviewed -defered. Requested Prescriptions     Signed Prescriptions Disp Refills    pneumococcal 23-valent (PNEUMOVAX 23) 25 mcg/0.5 mL injection 0.5 mL 0     Si.5 mL by IntraMUSCular route once for 1 dose.        Visit Vitals    /83 (BP 1 Location: Left arm, BP Patient Position: Sitting)    Pulse 63    Temp 97.9 °F (36.6 °C) (Oral)    Resp 13    Ht 6' 1\" (1.854 m)    Wt 245 lb (111.1 kg)    SpO2 98%    BMI 32.32 kg/m2          Upcoming Appts  No.     VORB:   Orders Placed This Encounter    HEMOGLOBIN A1C W/O EAG    METABOLIC PANEL, COMPREHENSIVE    TSH 3RD GENERATION    MICROALBUMIN, UR, RAND W/ MICROALB/CREAT RATIO    DISCONTD: pneumococcal 23-valent (PNEUMOVAX 23) 25 mcg/0.5 mL injection    pneumococcal 23-valent (PNEUMOVAX 23) 25 mcg/0.5 mL injection    MD Stephen Solis LPN

## 2018-05-21 ENCOUNTER — PATIENT OUTREACH (OUTPATIENT)
Dept: FAMILY MEDICINE CLINIC | Facility: CLINIC | Age: 53
End: 2018-05-21

## 2018-05-21 NOTE — PROGRESS NOTES
health screening:    Colonoscopy done June 28 2017 on its way to Dr. Yuniel Griffith office from Dr. Benja Ernst. To be repeated in 5 yrs. Closed this episode of care.

## 2018-08-01 ENCOUNTER — TELEPHONE (OUTPATIENT)
Dept: FAMILY MEDICINE CLINIC | Facility: CLINIC | Age: 53
End: 2018-08-01

## 2018-08-01 DIAGNOSIS — I10 ESSENTIAL HYPERTENSION: Primary | ICD-10-CM

## 2018-08-01 RX ORDER — LOSARTAN POTASSIUM AND HYDROCHLOROTHIAZIDE 25; 100 MG/1; MG/1
1 TABLET ORAL DAILY
Qty: 90 TAB | Refills: 1 | Status: SHIPPED | OUTPATIENT
Start: 2018-08-01 | End: 2019-02-27 | Stop reason: SDUPTHER

## 2018-08-09 ENCOUNTER — HOSPITAL ENCOUNTER (OUTPATIENT)
Dept: LAB | Age: 53
Discharge: HOME | End: 2018-08-09
Payer: COMMERCIAL

## 2018-08-09 ENCOUNTER — OFFICE VISIT (OUTPATIENT)
Dept: FAMILY MEDICINE CLINIC | Facility: CLINIC | Age: 53
End: 2018-08-09

## 2018-08-09 VITALS
BODY MASS INDEX: 31.65 KG/M2 | HEART RATE: 62 BPM | RESPIRATION RATE: 14 BRPM | TEMPERATURE: 98 F | HEIGHT: 73 IN | OXYGEN SATURATION: 99 % | DIASTOLIC BLOOD PRESSURE: 95 MMHG | WEIGHT: 238.8 LBS | SYSTOLIC BLOOD PRESSURE: 140 MMHG

## 2018-08-09 DIAGNOSIS — E11.21 TYPE 2 DIABETES WITH NEPHROPATHY (HCC): ICD-10-CM

## 2018-08-09 DIAGNOSIS — Z86.39 HISTORY OF VITAMIN D DEFICIENCY: ICD-10-CM

## 2018-08-09 DIAGNOSIS — E78.5 HYPERLIPIDEMIA, UNSPECIFIED HYPERLIPIDEMIA TYPE: ICD-10-CM

## 2018-08-09 DIAGNOSIS — I10 ESSENTIAL HYPERTENSION: Primary | ICD-10-CM

## 2018-08-09 DIAGNOSIS — I10 ESSENTIAL HYPERTENSION: ICD-10-CM

## 2018-08-09 LAB
25(OH)D3 SERPL-MCNC: 14.6 NG/ML (ref 30–100)
ALBUMIN SERPL-MCNC: 4.7 G/DL (ref 3.4–5)
ALBUMIN/GLOB SERPL: 1.6 {RATIO} (ref 0.8–1.7)
ALP SERPL-CCNC: 37 U/L (ref 45–117)
ALT SERPL-CCNC: 41 U/L (ref 16–61)
ANION GAP SERPL CALC-SCNC: 7 MMOL/L (ref 3–18)
AST SERPL-CCNC: 39 U/L (ref 15–37)
BILIRUB SERPL-MCNC: 0.3 MG/DL (ref 0.2–1)
BUN SERPL-MCNC: 16 MG/DL (ref 7–18)
BUN/CREAT SERPL: 13 (ref 12–20)
CALCIUM SERPL-MCNC: 9.6 MG/DL (ref 8.5–10.1)
CHLORIDE SERPL-SCNC: 106 MMOL/L (ref 100–108)
CHOLEST SERPL-MCNC: 104 MG/DL
CO2 SERPL-SCNC: 30 MMOL/L (ref 21–32)
CREAT SERPL-MCNC: 1.26 MG/DL (ref 0.6–1.3)
GLOBULIN SER CALC-MCNC: 3 G/DL (ref 2–4)
GLUCOSE SERPL-MCNC: 101 MG/DL (ref 74–99)
HBA1C MFR BLD: 6.7 % (ref 4.2–5.6)
HDLC SERPL-MCNC: 37 MG/DL (ref 40–60)
HDLC SERPL: 2.8 {RATIO} (ref 0–5)
LDLC SERPL CALC-MCNC: 47.8 MG/DL (ref 0–100)
LIPID PROFILE,FLP: ABNORMAL
POTASSIUM SERPL-SCNC: 4.7 MMOL/L (ref 3.5–5.5)
PROT SERPL-MCNC: 7.7 G/DL (ref 6.4–8.2)
SODIUM SERPL-SCNC: 143 MMOL/L (ref 136–145)
TRIGL SERPL-MCNC: 96 MG/DL (ref ?–150)
VLDLC SERPL CALC-MCNC: 19.2 MG/DL

## 2018-08-09 PROCEDURE — 36415 COLL VENOUS BLD VENIPUNCTURE: CPT | Performed by: INTERNAL MEDICINE

## 2018-08-09 PROCEDURE — 80061 LIPID PANEL: CPT | Performed by: INTERNAL MEDICINE

## 2018-08-09 PROCEDURE — 80053 COMPREHEN METABOLIC PANEL: CPT | Performed by: INTERNAL MEDICINE

## 2018-08-09 PROCEDURE — 82306 VITAMIN D 25 HYDROXY: CPT | Performed by: INTERNAL MEDICINE

## 2018-08-09 PROCEDURE — 83036 HEMOGLOBIN GLYCOSYLATED A1C: CPT | Performed by: INTERNAL MEDICINE

## 2018-08-09 NOTE — PROGRESS NOTES
Nanci Linder is a 50 y.o.@ presents today for office visit for follow up. Pt is in Room # 6.     1. Have you been to the ER, urgent care clinic since your last visit? Hospitalized since your last visit? No    2. Have you seen or consulted any other health care providers outside of the [facility] since your last visit? Include any pap smears or colon screening. No         Health Maintenance reviewed - pt declined.     Requested Prescriptions      No prescriptions requested or ordered in this encounter       Visit Vitals    BP (!) 140/95 (BP 1 Location: Right arm, BP Patient Position: Sitting)    Pulse 62    Temp 98 °F (36.7 °C) (Oral)    Resp 14    Ht 6' 1\" (1.854 m)    Wt 238 lb 12.8 oz (108.3 kg)    SpO2 99%    BMI 31.51 kg/m2          Upcoming Appts  no     VORB: No orders of the defined types were placed in this encounter.   MD Stacey Cr LPN

## 2018-08-09 NOTE — PROGRESS NOTES
Subjective:   Ann Marie Little is a 50 y.o. male who presents for follow up for HTN and DM2. HTN:  Pt was previously taking valsartan-hctz and amlodipine. Recently changed to losartan-hctz due to recent recall of valsartan. He is currently taking amlodipine, but has not obtained prescription for losartan-hctz. BP is 139/96 today. Pt reports that he will  the medication today. He denies headache, dizziness, SOB, chest pain, palpitations, orthopnea, pnd, or leg swelling. Hyperlipidemia: LDL and triglycerides are at goal.  HDL is low. He is taking Crestor, omega-3, and fenofibric acid in addition to making efforts with healthy diet and performing regular exercise. DM Type 2: This is at goal.  Hemoglobin A1c was 6.5 on 5/7/18. He is taking metformin , glipizide, and Lantus. Home glucose readings have been averaging 120 mg/dL. He denies hypoglycemia, blurred vision, n/v, or abdominal pain. He is up to date with DM eye exam.  He will follow up with podiatry for DM foot exam.    Vitamin D deficiency:  Pt reports a history of vitamin D deficiency diagnosed a few years ago. He was prescribed a supplement, but did not take it. Review of Systems   Constitutional: Negative for malaise/fatigue. Eyes: Negative for blurred vision. Respiratory: Negative for shortness of breath. Cardiovascular: Negative for chest pain, palpitations, orthopnea, claudication, leg swelling and PND. Neurological: Negative for dizziness, sensory change and headaches. Current Outpatient Prescriptions on File Prior to Visit   Medication Sig Dispense Refill    losartan-hydroCHLOROthiazide (HYZAAR) 100-25 mg per tablet Take 1 Tab by mouth daily.  Indications: hypertension 90 Tab 1    metFORMIN (GLUCOPHAGE) 1,000 mg tablet TK 1 T PO   Tab 1    glipiZIDE SR (GLUCOTROL XL) 2.5 mg CR tablet TK 1 T PO  ONCE A DAY WITH FOOD 90 Tab 1    rosuvastatin (CRESTOR) 5 mg tablet TK 1 T PO QD 90 Tab 1    LANTUS SOLOSTAR 100 unit/mL (3 mL) inpn INJECT 25 UNITS BENEATH THE SKIN ONCE A DAY  1    FLAXSEED OIL (OMEGA 3 PO) Take  by mouth.  fenofibric acid (TRILIPIX ER) 135 mg capsule TK ONE C PO  ONCE A DAY 90 Cap 1    amLODIPine (NORVASC) 5 mg tablet Take 1 Tab by mouth daily. 90 Tab 1     No current facility-administered medications on file prior to visit. Reviewed PmHx, RxHx, FmHx, SocHx, AllgHx and updated and dated in the chart. Nurse notes were reviewed and are correct    Objective:     Vitals:    08/09/18 0936 08/09/18 0942   BP: (!) 139/96 (!) 140/95   Pulse: 62    Resp: 14    Temp: 98 °F (36.7 °C)    TempSrc: Oral    SpO2: 99%    Weight: 238 lb 12.8 oz (108.3 kg)    Height: 6' 1\" (1.854 m)      Physical Exam   Constitutional: He is oriented to person, place, and time. He appears well-developed and well-nourished. HENT:   Head: Normocephalic and atraumatic. Mouth/Throat: Oropharynx is clear and moist.   Eyes: Conjunctivae and EOM are normal. Pupils are equal, round, and reactive to light. Neck: Normal range of motion. Neck supple. Cardiovascular: Normal rate, regular rhythm, normal heart sounds and intact distal pulses. Pulmonary/Chest: Effort normal and breath sounds normal.   Abdominal: Soft. Bowel sounds are normal.   Musculoskeletal: Normal range of motion. He exhibits no edema. Neurological: He is alert and oriented to person, place, and time. Skin: Skin is warm and dry. Psychiatric: He has a normal mood and affect. Nursing note and vitals reviewed. Assessment/ Plan:     Diagnoses and all orders for this visit:    1. Essential hypertension        -     Mildly elevated today. Pt is taking amlodipine, but has not taken losartan-hctz. Instructed to start medication as soon as possible        -     Counseled on DASH diet  -     METABOLIC PANEL, COMPREHENSIVE; Future    2. Hyperlipidemia, unspecified hyperlipidemia type  -     LIPID PANEL;  Future  -     Continue current regimen and efforts with diet and exercise    3. Type 2 diabetes with nephropathy (HCC)        -     This is at goal.  A1c 6.5        -     Continue current regimen  -     METABOLIC PANEL, COMPREHENSIVE; Future  -     HEMOGLOBIN A1C W/O EAG; Future    4. History of vitamin D deficiency  -     VITAMIN D, 25 HYDROXY; Future    5. BMI 31.0-31.9,adult        -     Weight is improving. Continue diet and exercise       I have discussed the diagnosis with the patient and the intended plan as seen in the above orders. The patient verbalized understanding and agrees with the plan.     Follow-up Disposition: Not on [facility], MD

## 2018-08-09 NOTE — MR AVS SNAPSHOT
[address] [place] [address] [id number] 
[phone] Patient: Maria R Dueñas MRN: [id number] :1965 Visit Information Date & Time Provider Department Dept. Phone Encounter #  
 2018  9:30 AM JERMAN Alvarado [facility] [phone] [id number] Follow-up Instructions Return in about 3 months (around 2018) for HTN, DM. Upcoming Health Maintenance Date Due  
 EYE EXAM RETINAL OR DILATED Q1 12/15/1977 Pneumococcal 19-64 Medium Risk (1 of 1 - PPSV23) 12/15/1986 DTaP/Tdap/Td series (1 - Tdap) 12/15/1988 Influenza Age 5 to Adult 2018 HEMOGLOBIN A1C Q6M 2018 LIPID PANEL Q1 2019 FOOT EXAM Q1 2019 MICROALBUMIN Q1 2019 COLONOSCOPY 2022 Allergies as of 2018  Review Complete On: 2018 By: Megan Villegas LPN No Known Allergies Current Immunizations  Reviewed on 2018 Name Date Influenza Vaccine (Quad) PF 2018 10:00 AM  
  
 Not reviewed this visit You Were Diagnosed With   
  
 Codes Comments Essential hypertension    -  Primary ICD-10-CM: I10 
ICD-9-CM: 401.9 Hyperlipidemia, unspecified hyperlipidemia type     ICD-10-CM: E78.5 ICD-9-CM: 272.4 Type 2 diabetes with nephropathy (HCC)     ICD-10-CM: E11.21 
ICD-9-CM: 250.40, 583.81 History of vitamin D deficiency     ICD-10-CM: Z86.39 
ICD-9-CM: V12.1 BMI 31.0-31.9,adult     ICD-10-CM: Z68.31 
ICD-9-CM: V85.31 Vitals BP Pulse Temp Resp Height(growth percentile) Weight(growth percentile) (!) 140/95 (BP 1 Location: Right arm, BP Patient Position: Sitting) 62 98 °F (36.7 °C) (Oral) 14 6' 1\" (1.854 m) 238 lb 12.8 oz (108.3 kg) SpO2 BMI Smoking Status 99% 31.51 kg/m2 Never Smoker Vitals History BMI and BSA Data Body Mass Index Body Surface Area  
 31.51 kg/m 2 2.36 m 2 Preferred Pharmacy Pharmacy Name Phone [id number], [address] [place] [address] AT [address] [phone] Your Updated Medication List  
  
   
This list is accurate as of 8/9/18 10:11 AM.  Always use your most recent med list. amLODIPine 5 mg tablet Commonly known as:  Az Mendeztiti Take 1 Tab by mouth daily. fenofibric acid 135 mg capsule Commonly known as:  TRILIPIX ER  
TK ONE C PO  ONCE A DAY  
  
 glipiZIDE SR 2.5 mg CR tablet Commonly known as:  GLUCOTROL XL  
TK 1 T PO  ONCE A DAY WITH FOOD  
  
 LANTUS SOLOSTAR U-100 INSULIN 100 unit/mL (3 mL) Inpn Generic drug:  insulin glargine INJECT 25 UNITS BENEATH THE SKIN ONCE A DAY  
  
 losartan-hydroCHLOROthiazide 100-25 mg per tablet Commonly known as:  HYZAAR Take 1 Tab by mouth daily. Indications: hypertension  
  
 metFORMIN 1,000 mg tablet Commonly known as:  GLUCOPHAGE  
TK 1 T PO  BID OMEGA 3 PO Take  by mouth. rosuvastatin 5 mg tablet Commonly known as:  CRESTOR TK 1 T PO QD Follow-up Instructions Return in about 3 months (around 11/9/2018) for HTN, DM. To-Do List   
 08/09/2018 Lab:  HEMOGLOBIN A1C W/O EAG   
  
 08/09/2018 Lab:  LIPID PANEL   
  
 08/09/2018 Lab:  METABOLIC PANEL, COMPREHENSIVE   
  
 08/09/2018 Lab:  VITAMIN D, 25 HYDROXY Introducing \[facility] [place] [facility]\"" & HEALTH SERVICES! Nuvia Llamas introduces [employer] patient portal. Now you can access parts of your medical record, email your doctor's office, and request medication refills online. 1. In your internet browser, go to [email or web address] 2. Click on the First Time User? Click Here link in the Sign In box. You will see the New Member Sign Up page. 3. Enter your Vis[employer] Access Code exactly as it appears below. You will not need to use this code after youve completed the sign-up process. If you do not sign up before the expiration date, you must request a new code. · [employer] Access Code: [email or web address] Expires: 8/24/2018  5:21 AM 
 
4. Enter the last four digits of your Social Security Number (xxxx) and Date of Birth (mm/dd/yyyy) as indicated and click Submit. You will be taken to the next sign-up page. 5. Create a [employer] ID. This will be your [employer] login ID and cannot be changed, so think of one that is secure and easy to remember. 6. Create a [employer] password. You can change your password at any time. 7. Enter your Password Reset Question and Answer. This can be used at a later time if you forget your password. 8. Enter your e-mail address. You will receive e-mail notification when new information is available in [address] 9. Click Sign Up. You can now view and download portions of your medical record. 10. Click the Download Summary menu link to download a portable copy of your medical information. If you have questions, please visit the Frequently Asked Questions section of the [employer] website. Remember, [employer] is NOT to be used for urgent needs. For medical emergencies, dial 911. Now available from your iPhone and Android! Please provide this summary of care documentation to your next provider. Your primary care clinician is listed as Griffin Montoya If you have any questions after today's visit, please call [phone].

## 2018-08-13 ENCOUNTER — TELEPHONE (OUTPATIENT)
Dept: FAMILY MEDICINE CLINIC | Facility: CLINIC | Age: 53
End: 2018-08-13

## 2018-08-13 DIAGNOSIS — E55.9 VITAMIN D DEFICIENCY: Primary | ICD-10-CM

## 2018-08-13 RX ORDER — ERGOCALCIFEROL 1.25 MG/1
50000 CAPSULE ORAL
Qty: 12 CAP | Refills: 0 | Status: SHIPPED | OUTPATIENT
Start: 2018-08-13 | End: 2019-05-28

## 2018-08-13 NOTE — PROGRESS NOTES
Hemoglobin A1c is at goal at 6.7. Triglycerides have improved to wnl. HDL has improved to 37. Vitamin D level is low. Recommend initiation of high dose vitamin D supplement. Prescription sent to pharmacy. Continue current medications and efforts with diet and exercise.

## 2018-11-09 ENCOUNTER — HOSPITAL ENCOUNTER (OUTPATIENT)
Dept: LAB | Age: 53
Discharge: HOME | End: 2018-11-09
Payer: COMMERCIAL

## 2018-11-09 ENCOUNTER — OFFICE VISIT (OUTPATIENT)
Dept: FAMILY MEDICINE CLINIC | Facility: CLINIC | Age: 53
End: 2018-11-09

## 2018-11-09 VITALS
TEMPERATURE: 98.7 F | OXYGEN SATURATION: 97 % | SYSTOLIC BLOOD PRESSURE: 138 MMHG | WEIGHT: 246 LBS | DIASTOLIC BLOOD PRESSURE: 75 MMHG | HEIGHT: 73 IN | HEART RATE: 66 BPM | RESPIRATION RATE: 12 BRPM | BODY MASS INDEX: 32.6 KG/M2

## 2018-11-09 DIAGNOSIS — Z79.4 CONTROLLED TYPE 2 DIABETES MELLITUS WITHOUT COMPLICATION, WITH LONG-TERM CURRENT USE OF INSULIN (HCC): ICD-10-CM

## 2018-11-09 DIAGNOSIS — Z23 ENCOUNTER FOR IMMUNIZATION: Primary | ICD-10-CM

## 2018-11-09 DIAGNOSIS — E55.9 VITAMIN D DEFICIENCY: ICD-10-CM

## 2018-11-09 DIAGNOSIS — E78.5 HYPERLIPIDEMIA, UNSPECIFIED HYPERLIPIDEMIA TYPE: ICD-10-CM

## 2018-11-09 DIAGNOSIS — E11.9 CONTROLLED TYPE 2 DIABETES MELLITUS WITHOUT COMPLICATION, WITH LONG-TERM CURRENT USE OF INSULIN (HCC): ICD-10-CM

## 2018-11-09 LAB
25(OH)D3 SERPL-MCNC: 61.1 NG/ML (ref 30–100)
ANION GAP SERPL CALC-SCNC: 8 MMOL/L (ref 3–18)
BUN SERPL-MCNC: 18 MG/DL (ref 7–18)
BUN/CREAT SERPL: 14 (ref 12–20)
CALCIUM SERPL-MCNC: 9.6 MG/DL (ref 8.5–10.1)
CHLORIDE SERPL-SCNC: 105 MMOL/L (ref 100–108)
CO2 SERPL-SCNC: 29 MMOL/L (ref 21–32)
CREAT SERPL-MCNC: 1.29 MG/DL (ref 0.6–1.3)
GLUCOSE SERPL-MCNC: 114 MG/DL (ref 74–99)
HBA1C MFR BLD: 7.3 % (ref 4.2–5.6)
POTASSIUM SERPL-SCNC: 4.1 MMOL/L (ref 3.5–5.5)
SODIUM SERPL-SCNC: 142 MMOL/L (ref 136–145)

## 2018-11-09 PROCEDURE — 80048 BASIC METABOLIC PNL TOTAL CA: CPT

## 2018-11-09 PROCEDURE — 36415 COLL VENOUS BLD VENIPUNCTURE: CPT

## 2018-11-09 PROCEDURE — 82306 VITAMIN D 25 HYDROXY: CPT

## 2018-11-09 PROCEDURE — 83036 HEMOGLOBIN GLYCOSYLATED A1C: CPT

## 2018-11-09 RX ORDER — ROSUVASTATIN CALCIUM 5 MG/1
TABLET, COATED ORAL
Qty: 90 TAB | Refills: 1 | Status: SHIPPED | OUTPATIENT
Start: 2018-11-09 | End: 2019-02-27 | Stop reason: ALTCHOICE

## 2018-11-09 NOTE — PROGRESS NOTES
Omaira Ramirez is a 50 y.o.@ presents today for office  visit for follow up. Pt is in Room # 4.     1. Have you been to the ER, urgent care clinic since your last visit? Hospitalized since your last visit? No    2. Have you seen or consulted any other health care providers outside of the [address] [place] [address] since your last visit? Include any pap smears or colon screening. No         Health Maintenance reviewed - flu shot given today. shingles prescrption given today. Pt had eye exam four months at [facility]. Requested Prescriptions     Signed Prescriptions Disp Refills    varicella-zoster recombinant, PF, (SHINGRIX, PF,) 50 mcg/0.5 mL susr injection 0.5 mL 0     Si.5 mL by IntraMUSCular route once for 1 dose.  rosuvastatin (CRESTOR) 5 mg tablet 90 Tab 1     Sig: TK 1 T PO QD       Visit Vitals  /75   Pulse 66   Temp 98.7 °F (37.1 °C) (Oral)   Resp 12   Ht 6' 1\" (1.854 m)   Wt 246 lb (111.6 kg)   SpO2 97%   BMI 32.46 kg/m²          Upcoming Appts  No.     VORB:   Orders Placed This Encounter    Influenza virus vaccine (QUADRIVALENT PRES FREE SYRINGE) IM ([id number])    VITAMIN D, 25 HYDROXY    METABOLIC PANEL, BASIC    HEMOGLOBIN A1C W/O EAG    varicella-zoster recombinant, PF, (SHINGRIX, PF,) 50 mcg/0.5 mL susr injection    rosuvastatin (CRESTOR) 5 mg tablet    MD Vianney Lyons LPN  Omaira Ramirez is a 50 y.o. male who presents for routine immunizations. He denies any symptoms , reactions or allergies that would exclude them from being immunized today. Risks and adverse reactions were discussed and the VIS was given to them. All questions were addressed. He was observed for10 min post injection. There were no reactions observed.     Vianney Martinez LPN

## 2018-11-09 NOTE — PROGRESS NOTES
Subjective:   Keiko Medina is a 50 y.o.  male who presents for follow-up of HTN, DM2, HLD and Immunization/Injection (flu shit given )    HTN:  BP is controlled on losartan-hctz and amlodipine. BP is 138/75 today. He denies headache, dizziness, SOB, chest pain, palpitations, orthopnea, pnd, or leg swelling.     Hyperlipidemia: LDL and triglycerides are at goal.  HDL is improving. He is taking Crestor, omega-3, and fenofibric acid in addition to making efforts with healthy diet and performing regular exercise.     DM Type 2: This is at goal.  Hemoglobin A1c was 6.7 on 8/9/18. He is taking metformin , glipizide, and Lantus. Home glucose readings have been averaging 120-125 mg/dL. He denies hypoglycemia, blurred vision, n/v, or abdominal pain. He is up to date with DM eye and foot exams.     Vitamin D deficiency:  Pt recently completed high-dose therapy and will have vitamin D level checked today.       Review of Systems   Constitutional: Negative for chills, diaphoresis, fever, malaise/fatigue and weight loss. Eyes: Negative for blurred vision. Respiratory: Negative for cough and shortness of breath. Cardiovascular: Negative for chest pain, palpitations, orthopnea, claudication, leg swelling and PND. Gastrointestinal: Negative for abdominal pain, blood in stool, constipation, diarrhea, melena, nausea and vomiting. Musculoskeletal: Negative for myalgias. Neurological: Negative for dizziness, tingling, sensory change, weakness and headaches.        Current Outpatient Medications on File Prior to Visit   Medication Sig Dispense Refill    metFORMIN (GLUCOPHAGE) 1,000 mg tablet TAKE 1 TABLET BY MOUTH TWICE DAILY 180 Tab 1    glipiZIDE SR (GLUCOTROL XL) 2.5 mg CR tablet TAKE 1 TABLET BY MOUTH EVERY DAY WITH FOOD 90 Tab 1    amLODIPine (NORVASC) 5 mg tablet TAKE 1 TABLET BY MOUTH DAILY 90 Tab 1    fenofibric acid (TRILIPIX ER) 135 mg capsule TAKE 1 CAPSULE BY MOUTH EVERY DAY 90 Cap 1    ergocalciferol (ERGOCALCIFEROL) 50,000 unit capsule Take 1 Cap by mouth every seven (7) days. 12 Cap 0    losartan-hydroCHLOROthiazide (HYZAAR) 100-25 mg per tablet Take 1 Tab by mouth daily. Indications: hypertension 90 Tab 1    LANTUS SOLOSTAR 100 unit/mL (3 mL) inpn INJECT 25 UNITS BENEATH THE SKIN ONCE A DAY  1    FLAXSEED OIL (OMEGA 3 PO) Take  by mouth. No current facility-administered medications on file prior to visit. Reviewed PmHx, RxHx, FmHx, SocHx, AllgHx and updated and dated in the chart. Nurse notes were reviewed and are correct    Objective:     Vitals:    11/09/18 0946   BP: 138/75   Pulse: 66   Resp: 12   Temp: 98.7 °F (37.1 °C)   TempSrc: Oral   SpO2: 97%   Weight: 246 lb (111.6 kg)   Height: 6' 1\" (1.854 m)     Physical Exam   Constitutional: He is oriented to person, place, and time. He appears well-developed and well-nourished. HENT:   Head: Normocephalic and atraumatic. Mouth/Throat: Oropharynx is clear and moist.   Eyes: Conjunctivae and EOM are normal. Pupils are equal, round, and reactive to light. Neck: Normal range of motion. Neck supple. Cardiovascular: Normal rate, regular rhythm, normal heart sounds and intact distal pulses. Pulmonary/Chest: Effort normal and breath sounds normal.   Abdominal: Soft. Bowel sounds are normal. He exhibits no distension and no mass. There is no tenderness. Musculoskeletal: Normal range of motion. He exhibits no edema. Neurological: He is alert and oriented to person, place, and time. Skin: Skin is warm and dry. Psychiatric: He has a normal mood and affect. Nursing note and vitals reviewed. Assessment/ Plan:     Diagnoses and all orders for this visit:    1. Encounter for immunization  -     INFLUENZA VIRUS VAC QUAD,SPLIT,PRESV FREE SYRINGE IM  -     varicella-zoster recombinant, PF, (SHINGRIX, PF,) 50 mcg/0.5 mL susr injection; 0.5 mL by IntraMUSCular route once for 1 dose.     2. Hyperlipidemia, unspecified hyperlipidemia type  -     rosuvastatin (CRESTOR) 5 mg tablet; TK 1 T PO QD    3. Controlled type 2 diabetes mellitus without complication, with long-term current use of insulin (HCC)  -     METABOLIC PANEL, BASIC; Future  -     HEMOGLOBIN A1C W/O EAG; Future    4. Vitamin D deficiency  -     VITAMIN D, 25 HYDROXY; Future       I have discussed the diagnosis with the patient and the intended plan as seen in the above orders. The patient verbalized understanding and agrees with the plan. Follow-up Disposition:  Return in about 3 months (around 2/9/2019) for HTN, DM2, HLD. Dirk Avina     [address], MD

## 2018-11-09 NOTE — PATIENT INSTRUCTIONS
Vaccine Information Statement    Influenza (Flu) Vaccine (Inactivated or Recombinant): What you need to know    Many Vaccine Information Statements are available in Estonian and other languages. See www.immunize.org/vis  Hojas de Información Sobre Vacunas están disponibles en Español y en muchos otros idiomas. Visite www.immunize.org/vis    1. Why get vaccinated? Influenza (flu) is a contagious disease that spreads around the [place] every year, usually between October and May. Flu is caused by influenza viruses, and is spread mainly by coughing, sneezing, and close contact. Anyone can get flu. Flu strikes suddenly and can last several days. Symptoms vary by age, but can include:   fever/chills   sore throat   muscle aches   fatigue   cough   headache    runny or stuffy nose    Flu can also lead to pneumonia and blood infections, and cause diarrhea and seizures in children. If you have a medical condition, such as heart or lung disease, flu can make it worse. Flu is more dangerous for some people. Infants and young children, people 72years of age and older, pregnant women, and people with certain health conditions or a weakened immune system are at greatest risk. Each year thousands of people in the [facility] die from flu, and many more are hospitalized. Flu vaccine can:   keep you from getting flu,   make flu less severe if you do get it, and   keep you from spreading flu to your family and other people. 2. Inactivated and recombinant flu vaccines    A dose of flu vaccine is recommended every flu season. Children 6 months through 6years of age may need two doses during the same flu season. Everyone else needs only one dose each flu season.        Some inactivated flu vaccines contain a very small amount of a mercury-based preservative called thimerosal. Studies have not shown thimerosal in vaccines to be harmful, but flu vaccines that do not contain thimerosal are available. There is no live flu virus in flu shots. They cannot cause the flu. There are many flu viruses, and they are always changing. Each year a new flu vaccine is made to protect against three or four viruses that are likely to cause disease in the upcoming flu season. But even when the vaccine doesnt exactly match these viruses, it may still provide some protection    Flu vaccine cannot prevent:   flu that is caused by a virus not covered by the vaccine, or   illnesses that look like flu but are not. It takes about 2 weeks for protection to develop after vaccination, and protection lasts through the flu season. 3. Some people should not get this vaccine    Tell the person who is giving you the vaccine:     If you have any severe, life-threatening allergies. If you ever had a life-threatening allergic reaction after a dose of flu vaccine, or have a severe allergy to any part of this vaccine, you may be advised not to get vaccinated. Most, but not all, types of flu vaccine contain a small amount of egg protein.  If you ever had Guillain-Barré Syndrome (also called GBS). Some people with a history of GBS should not get this vaccine. This should be discussed with your doctor.  If you are not feeling well. It is usually okay to get flu vaccine when you have a mild illness, but you might be asked to come back when you feel better. 4. Risks of a vaccine reaction    With any medicine, including vaccines, there is a chance of reactions. These are usually mild and go away on their own, but serious reactions are also possible. Most people who get a flu shot do not have any problems with it.      Minor problems following a flu shot include:    soreness, redness, or swelling where the shot was given     hoarseness   sore, red or itchy eyes   cough   fever   aches   headache   itching   fatigue  If these problems occur, they usually begin soon after the shot and last 1 or 2 days. More serious problems following a flu shot can include the following:     There may be a small increased risk of Guillain-Barré Syndrome (GBS) after inactivated flu vaccine. This risk has been estimated at 1 or 2 additional cases per million people vaccinated. This is much lower than the risk of severe complications from flu, which can be prevented by flu vaccine.  Young children who get the flu shot along with pneumococcal vaccine (PCV13) and/or DTaP vaccine at the same time might be slightly more likely to have a seizure caused by fever. Ask your doctor for more information. Tell your doctor if a child who is getting flu vaccine has ever had a seizure. Problems that could happen after any injected vaccine:      People sometimes faint after a medical procedure, including vaccination. Sitting or lying down for about 15 minutes can help prevent fainting, and injuries caused by a fall. Tell your doctor if you feel dizzy, or have vision changes or ringing in the ears.  Some people get severe pain in the shoulder and have difficulty moving the arm where a shot was given. This happens very rarely.  Any medication can cause a severe allergic reaction. Such reactions from a vaccine are very rare, estimated at about 1 in a million doses, and would happen within a few minutes to a few hours after the vaccination. As with any medicine, there is a very remote chance of a vaccine causing a serious injury or death. The safety of vaccines is always being monitored. For more information, visit: www.cdc.gov/vaccinesafety/    5. What if there is a serious reaction? What should I look for?  Look for anything that concerns you, such as signs of a severe allergic reaction, very high fever, or unusual behavior.     Signs of a severe allergic reaction can include hives, swelling of the face and throat, difficulty breathing, a fast heartbeat, dizziness, and weakness - usually within a few minutes to a few hours after the vaccination. What should I do?  If you think it is a severe allergic reaction or other emergency that cant wait, call 9-1-1 and get the person to the nearest hospital. Otherwise, call your doctor.  Reactions should be reported to the Vaccine Adverse Event Reporting System (VAERS). Your doctor should file this report, or you can do it yourself through  the VAERS web site at www.vaers. [facility].gov, or by calling 7-222.627.9704. VAERS does not give medical advice. 6. The National Vaccine Injury Compensation Program    The [facility] Vaccine Injury Compensation Program (VICP) is a federal program that was created to compensate people who may have been injured by certain vaccines. Persons who believe they may have been injured by a vaccine can learn about the program and about filing a claim by calling 4-972.770.5880 or visiting the [employer] website at www.[facility].gov/vaccinecompensation. There is a time limit to file a claim for compensation. 7. How can I learn more?  Ask your healthcare provider. He or she can give you the vaccine package insert or suggest other sources of information.  Call your local or state health department.  Contact the Centers for Disease Control and Prevention (CDC):  - Call 1-998.338.6142 (1-800-CDC-INFO) or  - Visit CDCs website at www.cdc.gov/flu    Vaccine Information Statement   Inactivated Influenza Vaccine   8/7/2015  [address] 240LE-97    Department of Health and Human Services  Centers for Disease Control and Prevention    Office Use Only

## 2019-02-27 ENCOUNTER — OFFICE VISIT (OUTPATIENT)
Dept: FAMILY MEDICINE CLINIC | Facility: CLINIC | Age: 54
End: 2019-02-27

## 2019-02-27 ENCOUNTER — HOSPITAL ENCOUNTER (OUTPATIENT)
Dept: LAB | Age: 54
Discharge: HOME | End: 2019-02-27
Payer: COMMERCIAL

## 2019-02-27 VITALS
RESPIRATION RATE: 15 BRPM | HEIGHT: 73 IN | TEMPERATURE: 97.3 F | WEIGHT: 242 LBS | OXYGEN SATURATION: 98 % | HEART RATE: 65 BPM | DIASTOLIC BLOOD PRESSURE: 78 MMHG | BODY MASS INDEX: 32.07 KG/M2 | SYSTOLIC BLOOD PRESSURE: 134 MMHG

## 2019-02-27 DIAGNOSIS — E11.21 TYPE 2 DIABETES WITH NEPHROPATHY (HCC): ICD-10-CM

## 2019-02-27 DIAGNOSIS — I10 ESSENTIAL HYPERTENSION: Primary | ICD-10-CM

## 2019-02-27 DIAGNOSIS — E78.5 DYSLIPIDEMIA: ICD-10-CM

## 2019-02-27 DIAGNOSIS — M79.10 MUSCLE SORENESS: ICD-10-CM

## 2019-02-27 LAB
ALBUMIN SERPL-MCNC: 4.6 G/DL (ref 3.4–5)
ALBUMIN/GLOB SERPL: 1.6 {RATIO} (ref 0.8–1.7)
ALP SERPL-CCNC: 40 U/L (ref 45–117)
ALT SERPL-CCNC: 37 U/L (ref 16–61)
ANION GAP SERPL CALC-SCNC: 8 MMOL/L (ref 3–18)
AST SERPL-CCNC: 28 U/L (ref 15–37)
BILIRUB SERPL-MCNC: 0.2 MG/DL (ref 0.2–1)
BUN SERPL-MCNC: 17 MG/DL (ref 7–18)
BUN/CREAT SERPL: 13 (ref 12–20)
CALCIUM SERPL-MCNC: 9.3 MG/DL (ref 8.5–10.1)
CHLORIDE SERPL-SCNC: 105 MMOL/L (ref 100–108)
CHOLEST SERPL-MCNC: 85 MG/DL
CK SERPL-CCNC: 123 U/L (ref 39–308)
CO2 SERPL-SCNC: 27 MMOL/L (ref 21–32)
CREAT SERPL-MCNC: 1.33 MG/DL (ref 0.6–1.3)
GLOBULIN SER CALC-MCNC: 2.9 G/DL (ref 2–4)
GLUCOSE SERPL-MCNC: 164 MG/DL (ref 74–99)
HBA1C MFR BLD: 8 % (ref 4.2–5.6)
HDLC SERPL-MCNC: 26 MG/DL (ref 40–60)
HDLC SERPL: 3.3 {RATIO} (ref 0–5)
LDLC SERPL CALC-MCNC: ABNORMAL MG/DL (ref 0–100)
LIPID PROFILE,FLP: ABNORMAL
POTASSIUM SERPL-SCNC: 4.2 MMOL/L (ref 3.5–5.5)
PROT SERPL-MCNC: 7.5 G/DL (ref 6.4–8.2)
SODIUM SERPL-SCNC: 140 MMOL/L (ref 136–145)
T4 FREE SERPL-MCNC: 1.1 NG/DL (ref 0.7–1.5)
TRIGL SERPL-MCNC: 324 MG/DL (ref ?–150)
TSH SERPL DL<=0.05 MIU/L-ACNC: 2 UIU/ML (ref 0.36–3.74)
VLDLC SERPL CALC-MCNC: ABNORMAL MG/DL

## 2019-02-27 PROCEDURE — 83036 HEMOGLOBIN GLYCOSYLATED A1C: CPT

## 2019-02-27 PROCEDURE — 84439 ASSAY OF FREE THYROXINE: CPT

## 2019-02-27 PROCEDURE — 36415 COLL VENOUS BLD VENIPUNCTURE: CPT

## 2019-02-27 PROCEDURE — 84443 ASSAY THYROID STIM HORMONE: CPT

## 2019-02-27 PROCEDURE — 80053 COMPREHEN METABOLIC PANEL: CPT

## 2019-02-27 PROCEDURE — 82550 ASSAY OF CK (CPK): CPT

## 2019-02-27 PROCEDURE — 80061 LIPID PANEL: CPT

## 2019-02-27 RX ORDER — INSULIN GLARGINE 100 [IU]/ML
INJECTION, SOLUTION SUBCUTANEOUS
Qty: 5 ADJUSTABLE DOSE PRE-FILLED PEN SYRINGE | Refills: 5 | Status: SHIPPED | OUTPATIENT
Start: 2019-02-27 | End: 2020-04-06 | Stop reason: SDUPTHER

## 2019-02-27 RX ORDER — LOSARTAN POTASSIUM AND HYDROCHLOROTHIAZIDE 25; 100 MG/1; MG/1
1 TABLET ORAL DAILY
Qty: 90 TAB | Refills: 1 | Status: SHIPPED | OUTPATIENT
Start: 2019-02-27 | End: 2019-09-18 | Stop reason: SDUPTHER

## 2019-02-27 RX ORDER — PRAVASTATIN SODIUM 20 MG/1
20 TABLET ORAL
Qty: 30 TAB | Refills: 5 | Status: SHIPPED | OUTPATIENT
Start: 2019-02-27 | End: 2019-05-28

## 2019-02-27 NOTE — PROGRESS NOTES
Subjective:   Isidro López is a 48 y.o.  male who presents for follow-up of HTN, DM2, HLD. HTN:  BP is controlled on losartan-hctz and amlodipine. Pt denies headache, dizziness, SOB, chest pain, palpitations, orthopnea, pnd, or leg swelling.     Hyperlipidemia: LDL and triglycerides are at goal.  HDL is a few points below goal at 37. He is taking Crestor, omega-3, and fenofibric acid in addition to making efforts with healthy diet and performing regular exercise.     DM Type 2:  Hemoglobin A1c was 7.3 on 11/9/18. He is taking metformin , glipizide, and Lantus. Home glucose readings have been averaging 120-140 mg/dL. He denies hypoglycemia, blurred vision, n/v, or abdominal pain. He is up to date with DM eye and foot exams.     Muscle soreness:  Pt reports a 3 month history of mild muscle soreness of bilateral shoulders. He denies strenuous activity, trauma, redness, swelling, weakness, tingling/numbness. Pt reports symptoms are very mild, but noticeable.     Review of Systems   Constitutional: Negative for chills, diaphoresis, fever, malaise/fatigue and weight loss. Eyes: Negative for blurred vision. Respiratory: Negative for cough and shortness of breath. Cardiovascular: Negative for chest pain, palpitations, orthopnea, claudication, leg swelling and PND. Gastrointestinal: Negative for abdominal pain, blood in stool, constipation, diarrhea, melena, nausea and vomiting. Musculoskeletal: Positive for myalgias. Neurological: Negative for dizziness, tingling, sensory change, weakness and headaches. Psychiatric/Behavioral: Negative for depression.        Current Outpatient Medications on File Prior to Visit   Medication Sig Dispense Refill    metFORMIN (GLUCOPHAGE) 1,000 mg tablet TAKE 1 TABLET BY MOUTH TWICE DAILY 180 Tab 1    glipiZIDE SR (GLUCOTROL XL) 2.5 mg CR tablet TAKE 1 TABLET BY MOUTH EVERY DAY WITH FOOD 90 Tab 1    amLODIPine (NORVASC) 5 mg tablet TAKE 1 TABLET BY MOUTH DAILY 90 Tab 1    fenofibric acid (TRILIPIX ER) 135 mg capsule TAKE 1 CAPSULE BY MOUTH EVERY DAY 90 Cap 1    FLAXSEED OIL (OMEGA 3 PO) Take  by mouth.  ergocalciferol (ERGOCALCIFEROL) 50,000 unit capsule Take 1 Cap by mouth every seven (7) days. 12 Cap 0     No current facility-administered medications on file prior to visit. Reviewed PmHx, RxHx, FmHx, SocHx, AllgHx and updated and dated in the chart. Nurse notes were reviewed and are correct    Objective:     Vitals:    02/27/19 0909   BP: 134/78   Pulse: 65   Resp: 15   Temp: 97.3 °F (36.3 °C)   TempSrc: Oral   SpO2: 98%   Weight: 242 lb (109.8 kg)   Height: 6' 1\" (1.854 m)     Physical Exam   Constitutional: He is oriented to person, place, and time. He appears well-developed and well-nourished. HENT:   Head: Normocephalic and atraumatic. Mouth/Throat: Oropharynx is clear and moist.   Eyes: Conjunctivae and EOM are normal. Pupils are equal, round, and reactive to light. Neck: Normal range of motion. Neck supple. Cardiovascular: Normal rate, regular rhythm, normal heart sounds and intact distal pulses. Pulmonary/Chest: Effort normal and breath sounds normal.   Abdominal: Soft. Bowel sounds are normal. He exhibits no distension and no mass. There is no tenderness. Musculoskeletal: Normal range of motion. He exhibits no edema. Right shoulder: He exhibits tenderness (mildly TTP of deltoid). He exhibits normal range of motion, no bony tenderness, no swelling, no effusion, no crepitus, no deformity, no laceration, no pain, no spasm, normal pulse and normal strength. Left shoulder: He exhibits tenderness (mildly TTP of deltoid). He exhibits normal range of motion, no bony tenderness, no swelling, no effusion, no crepitus, no deformity, no laceration, no pain, no spasm, normal pulse and normal strength. Neurological: He is alert and oriented to person, place, and time. Skin: Skin is warm and dry.    Psychiatric: He has a normal mood and affect. Nursing note and vitals reviewed. Assessment/ Plan:     Diagnoses and all orders for this visit:    1. Essential hypertension        -     BP is controlled. Continue current antihypertensive medication. Continue efforts with DASH diet. -     losartan-hydroCHLOROthiazide (HYZAAR) 100-25 mg per tablet; Take 1 Tab by mouth daily. 2. Type 2 diabetes with nephropathy (HCC)        -     Hemoglobin a1c 7.3 last check. Continue current medications. -     LANTUS SOLOSTAR U-100 INSULIN 100 unit/mL (3 mL) inpn; INJECT 25 UNITS BENEATH THE SKIN ONCE A DAY  -     HEMOGLOBIN A1C W/O EAG; Future  -     METABOLIC PANEL, COMPREHENSIVE; Future    3. Dyslipidemia  -     LIPID PANEL; Future  -     pravastatin (PRAVACHOL) 20 mg tablet; Take 1 Tab by mouth nightly. 4. Muscle soreness  -     CK; Future  -     TSH 3RD GENERATION; Future  -     T4, FREE; Future    5. BMI 31.0-31.9,adult        -     Weight is improving. Continue efforts with diet and exercise. I have discussed the diagnosis with the patient and the intended plan as seen in the above orders. The patient verbalized understanding and agrees with the plan. Follow-up Disposition:  Return in about 3 months (around 5/27/2019) for HTN, DM, dyslipidemia.     Aguila Waldrop MD

## 2019-02-27 NOTE — PROGRESS NOTES
Cat Chung is a 48 y.o.@ presents today for office visit for follow up. Pt is in Room # 5.     1. Have you been to the ER, urgent care clinic since your last visit? Hospitalized since your last visit? No    2. Have you seen or consulted any other health care providers outside of the [address] [place] [address] since your last visit? Include any pap smears or colon screening. No         Health Maintenance reviewed - up to date. .    Requested Prescriptions     Signed Prescriptions Disp Refills    LANTUS SOLOSTAR U-100 INSULIN 100 unit/mL (3 mL) inpn 5 Adjustable Dose Pre-filled Pen Syringe 5     Sig: INJECT 25 UNITS BENEATH THE SKIN ONCE A DAY    losartan-hydroCHLOROthiazide (HYZAAR) 100-25 mg per tablet 90 Tab 1     Sig: Take 1 Tab by mouth daily.  pravastatin (PRAVACHOL) 20 mg tablet 30 Tab 5     Sig: Take 1 Tab by mouth nightly.        Visit Vitals  /78 (BP 1 Location: Right arm, BP Patient Position: Sitting)   Pulse 65   Temp 97.3 °F (36.3 °C) (Oral)   Resp 15   Ht 6' 1\" (1.854 m)   Wt 242 lb (109.8 kg)   SpO2 98%   BMI 31.93 kg/m²          Upcoming Appts  No.     VORB:   Orders Placed This Encounter    CK    LIPID PANEL    HEMOGLOBIN A1C W/O EAG    METABOLIC PANEL, COMPREHENSIVE    TSH 3RD GENERATION    T4, FREE    LANTUS SOLOSTAR U-100 INSULIN 100 unit/mL (3 mL) inpn    losartan-hydroCHLOROthiazide (HYZAAR) 100-25 mg per tablet    pravastatin (PRAVACHOL) 20 mg tablet    MD Angela Villegasch, LPN

## 2019-03-12 NOTE — PROGRESS NOTES
Hemoglobin a1c has increased to 8.0. Need to be sure to maintain efforts with diabetic diet and all prescribed medications. Lipid panel shows elevated triglycerides. Will need to continue current medications and efforts with healthy diet and exercise. It is unclear if blood sample was fasting. Recommend repeating fasting lipid panel being sure patient has fasted for at least 9-12 hours. Kidney function is stable. Suggest drinking more water. Follow up for regularly scheduled appointment.

## 2019-03-15 NOTE — PROGRESS NOTES
Called pt and unable to leave message. Number no longer in service. The call was to inform pt results    Letter was sent .

## 2019-03-29 DIAGNOSIS — E78.5 HYPERLIPIDEMIA, UNSPECIFIED HYPERLIPIDEMIA TYPE: ICD-10-CM

## 2019-03-29 DIAGNOSIS — I10 ESSENTIAL HYPERTENSION: ICD-10-CM

## 2019-04-01 RX ORDER — AMLODIPINE BESYLATE 5 MG/1
TABLET ORAL
Qty: 90 TAB | Refills: 0 | Status: SHIPPED | OUTPATIENT
Start: 2019-04-01 | End: 2019-05-28

## 2019-04-01 RX ORDER — FENOFIBRIC ACID 135 MG/1
CAPSULE, DELAYED RELEASE ORAL
Qty: 90 CAP | Refills: 0 | Status: SHIPPED | OUTPATIENT
Start: 2019-04-01 | End: 2019-08-13

## 2019-04-02 NOTE — TELEPHONE ENCOUNTER
90 day refill approved. Appt needed sooner. A1c is not at goal. Pt should be checking BS before and 2 hr after eating.  Need to repeat fasting lipid panel and BMP

## 2019-05-28 ENCOUNTER — OFFICE VISIT (OUTPATIENT)
Dept: FAMILY MEDICINE CLINIC | Facility: CLINIC | Age: 54
End: 2019-05-28

## 2019-05-28 ENCOUNTER — HOSPITAL ENCOUNTER (OUTPATIENT)
Dept: LAB | Age: 54
Discharge: HOME | End: 2019-05-28
Payer: COMMERCIAL

## 2019-05-28 VITALS
TEMPERATURE: 97.6 F | BODY MASS INDEX: 31.28 KG/M2 | RESPIRATION RATE: 15 BRPM | SYSTOLIC BLOOD PRESSURE: 120 MMHG | DIASTOLIC BLOOD PRESSURE: 80 MMHG | WEIGHT: 236 LBS | HEART RATE: 62 BPM | OXYGEN SATURATION: 98 % | HEIGHT: 73 IN

## 2019-05-28 DIAGNOSIS — Z79.4 CONTROLLED TYPE 2 DIABETES MELLITUS WITHOUT COMPLICATION, WITH LONG-TERM CURRENT USE OF INSULIN (HCC): ICD-10-CM

## 2019-05-28 DIAGNOSIS — E66.9 OBESITY, CLASS I, BMI 30-34.9: ICD-10-CM

## 2019-05-28 DIAGNOSIS — I10 ESSENTIAL HYPERTENSION: Primary | ICD-10-CM

## 2019-05-28 DIAGNOSIS — E11.9 CONTROLLED TYPE 2 DIABETES MELLITUS WITHOUT COMPLICATION, WITH LONG-TERM CURRENT USE OF INSULIN (HCC): ICD-10-CM

## 2019-05-28 DIAGNOSIS — Z13.31 SCREENING FOR DEPRESSION: ICD-10-CM

## 2019-05-28 PROBLEM — E11.21 TYPE 2 DIABETES WITH NEPHROPATHY (HCC): Status: RESOLVED | Noted: 2018-08-09 | Resolved: 2019-05-28

## 2019-05-28 LAB
CHOLEST SERPL-MCNC: 163 MG/DL
CREAT UR-MCNC: 202 MG/DL (ref 30–125)
HBA1C MFR BLD HPLC: 6.6 %
HDLC SERPL-MCNC: 35 MG/DL (ref 40–60)
HDLC SERPL: 4.7 {RATIO} (ref 0–5)
LDLC SERPL CALC-MCNC: 93.2 MG/DL (ref 0–100)
LIPID PROFILE,FLP: ABNORMAL
MICROALBUMIN UR-MCNC: 1.4 MG/DL (ref 0–3)
MICROALBUMIN/CREAT UR-RTO: 7 MG/G (ref 0–30)
TRIGL SERPL-MCNC: 174 MG/DL (ref ?–150)
VLDLC SERPL CALC-MCNC: 34.8 MG/DL

## 2019-05-28 PROCEDURE — 36415 COLL VENOUS BLD VENIPUNCTURE: CPT

## 2019-05-28 PROCEDURE — 82043 UR ALBUMIN QUANTITATIVE: CPT

## 2019-05-28 PROCEDURE — 80061 LIPID PANEL: CPT

## 2019-05-28 RX ORDER — GLIPIZIDE 2.5 MG/1
TABLET, EXTENDED RELEASE ORAL
Qty: 90 TAB | Refills: 3 | Status: CANCELLED | OUTPATIENT
Start: 2019-05-28

## 2019-05-28 RX ORDER — METFORMIN HYDROCHLORIDE 1000 MG/1
TABLET ORAL
Qty: 180 TAB | Refills: 3 | Status: CANCELLED | OUTPATIENT
Start: 2019-05-28

## 2019-05-28 NOTE — PROGRESS NOTES
Internal Medicine Progress Note    Today's Date:  2019   Patient:  Michelle Lovell  Patient :  1965    Subjective:     Chief Complaint   Patient presents with    Diabetes     BS:130    Hypertension    Cholesterol Problem    Results      HTN  This is a chronic problem, new to me. BP is controlled. Pt takes losartan-hctz and amlodipine.  Pt reports compliance with these medications.      Hyperlipidemia/Obesity Class I  This is a chronic problem, new to me. HDL and triglycerides are not at goal. Pt stopped pravastatin. Pt stated that this gave him muscle ache. Pt lost weight since the last visit. Lab Results   Component Value Date/Time    Cholesterol, total 85 2019 09:55 AM    HDL Cholesterol 26 (L) 2019 09:55 AM    LDL, calculated Cannot be calculated 2019 09:55 AM    VLDL, calculated  2019 09:55 AM     Calculation not valid with this patient's other Lipid values. Triglyceride 324 (H) 2019 09:55 AM    CHOL/HDL Ratio 3.3 2019 09:55 AM      DM Type 2  This is a chronic problem, new to me. This is at goal. He is taking metformin and lantus.  BS log reviewed. 3 most recent PHQ Screens 2019   Little interest or pleasure in doing things Not at all   Feeling down, depressed, irritable, or hopeless Not at all   Total Score PHQ 2 0     Past Medical History:   Diagnosis Date    Diabetes ([facility] [address] 2016    Type II    Gout 2008    Hypercholesterolemia     Hypertension     Obesity, Class I, BMI 30-34.9 2019     History reviewed. No pertinent surgical history. reports that he has never smoked. He has never used smokeless tobacco. He reports that he drinks alcohol. He reports that he does not use drugs.   Family History   Problem Relation Age of Onset    Cancer Mother 54    Diabetes Father         Type II    No Known Problems Sister     No Known Problems Maternal Grandmother     Heart Attack Maternal Grandfather     Diabetes Paternal Grandmother     Diabetes Paternal Grandfather     Other Daughter     Other Daughter      No Known Allergies     Review of Systems   CV:      chest pain, palpitations  PULM:  SOB, wheezing, cough, sputum production    Current Outpatient Meds and Allergies     Current Outpatient Medications on File Prior to Visit   Medication Sig Dispense Refill    fenofibric acid (TRILIPIX ER) 135 mg capsule TAKE 1 CAPSULE BY MOUTH EVERY DAY 90 Cap 0    LANTUS SOLOSTAR U-100 INSULIN 100 unit/mL (3 mL) inpn INJECT 25 UNITS BENEATH THE SKIN ONCE A DAY 5 Adjustable Dose Pre-filled Pen Syringe 5    losartan-hydroCHLOROthiazide (HYZAAR) 100-25 mg per tablet Take 1 Tab by mouth daily. 90 Tab 1    FLAXSEED OIL (OMEGA 3 PO) Take  by mouth. No current facility-administered medications on file prior to visit. No Known Allergies     Objective:       Visit Vitals  /80 (BP 1 Location: Left arm, BP Patient Position: Sitting) Comment: manual   Pulse 62   Temp 97.6 °F (36.4 °C) (Oral)   Resp 15   Ht 6' 1\" (1.854 m)   Wt 236 lb (107 kg)   SpO2 98%   BMI 31.14 kg/m²     General:   Well-nourished, well-groomed, pleasant, alert, in no acute distress  Head:  Normocephalic, atraumatic  Ears:  External ears WNL  Nose:  External nares WNL  Psych:  No pressured speech, no abnormal thought content    Last Point of Care HGB A1C  Hemoglobin A1c (POC)   Date Value Ref Range Status   05/28/2019 6.6 % Final      Assessment/Plan & Orders:         ICD-10-CM ICD-9-CM    1. Essential hypertension I10 401.9    2. Controlled type 2 diabetes mellitus without complication, with long-term current use of insulin (HCC) E11.9 250.00 MICROALBUMIN, UR, RAND W/ MICROALB/CREAT RATIO    Z79.4 V58.67 AMB POC HEMOGLOBIN A1C      LIPID PANEL W/ REFLX DIRECT LDL      SITagliptin-metFORMIN (JANUMET) 50-1,000 mg per tablet      HM DIABETES FOOT EXAM   3. Obesity, Class I, BMI 30-34.9 E66.9 278.00    4.  Screening for depression Z13.31 V79.0 [place] DEPRESSION SCREEN ANNUAL     Information given on ketogenic/IF diet with exercise  Stop metformin, glipizide and norvasc  Decrease lantus to maintain BS between 100 and 200  Pt has a podiatrist    Follow-up and Dispositions    · Return in about 5 weeks (around 7/2/2019) for CPE, Hypertension, Weight management, Diabetes mellitus, Go over lab/imaging results, Hyperlipidemia. *Patient verbalized understanding and agreement with the plan. Patient was given an after-visit summary. Oneil Rosales.  [facility] [address], MD - Internal Medicine  5/28/2019, 11:17 AM  [facility]  [address], [address]  Phone [phone]  Fax [phone]

## 2019-05-28 NOTE — PROGRESS NOTES
History and Physical 
 
Today's Date:  2019 Patient's Name: Luís Genao Patient's :  1965 History: Chief Complaint Patient presents with  Diabetes BS:130  Hypertension  Cholesterol Problem  Results HTN This is a chronic problem, new to me. BP is controlled. Pt takes losartan-hctz and amlodipine.  Pt reports compliance with these medications.  
  
Hyperlipidemia/Obesity Class I This is a chronic problem, new to me. HDL and triglycerides are not at goal. Pt stopped pravastatin. Pt stated that this gave him muscle ache. Pt lost weight since the last visit. Lab Results Component Value Date/Time Cholesterol, total 85 2019 09:55 AM  
 HDL Cholesterol 26 (L) 2019 09:55 AM  
 LDL, calculated Cannot be calculated 2019 09:55 AM  
 VLDL, calculated  2019 09:55 AM  
  Calculation not valid with this patient's other Lipid values. Triglyceride 324 (H) 2019 09:55 AM  
 CHOL/HDL Ratio 3.3 2019 09:55 AM  
  
DM Type 2 This is a chronic problem, new to me. This is at goal. He is taking metformin and lantus.  BS log reviewed. 
  
3 most recent PHQ Screens 2019 Little interest or pleasure in doing things Not at all Feeling down, depressed, irritable, or hopeless Not at all Total Score PHQ 2 0 Past Medical History:  
Diagnosis Date  Diabetes ([facility] [address] 2016 Type II  
 Gout 2008  Hypercholesterolemia  Hypertension  Obesity, Class I, BMI 30-34.9 2019 History reviewed. No pertinent surgical history. reports that he has never smoked. He has never used smokeless tobacco. He reports that he drinks alcohol. He reports that he does not use drugs. Family History Problem Relation Age of Onset Sam De La Rosa Mother 54  Diabetes Father Type II  
 No Known Problems Sister  No Known Problems Maternal Grandmother  Heart Attack Maternal Grandfather  Diabetes Paternal Grandmother  Diabetes Paternal Grandfather  Other Daughter  Other Daughter No Known Allergies Problem List:  
  
Patient Active Problem List  
Diagnosis Code  Essential hypertension I10  Type 2 diabetes mellitus without complication, with long-term current use of insulin ([facility]) E11.9, Z79.4  Hyperlipidemia E78.5  Obesity, Class I, BMI 30-34.9 E66.9 Medications:  
 
Current Outpatient Medications Medication Sig  
 SITagliptin-metFORMIN (JANUMET) 50-1,000 mg per tablet Take 1 Tab by mouth two (2) times daily (with meals).  fenofibric acid (TRILIPIX ER) 135 mg capsule TAKE 1 CAPSULE BY MOUTH EVERY DAY  LANTUS SOLOSTAR U-100 INSULIN 100 unit/mL (3 mL) inpn INJECT 25 UNITS BENEATH THE SKIN ONCE A DAY  losartan-hydroCHLOROthiazide (HYZAAR) 100-25 mg per tablet Take 1 Tab by mouth daily.  FLAXSEED OIL (OMEGA 3 PO) Take  by mouth. No current facility-administered medications for this visit. Review of Systems:  
General:   fevers, chills Neurologic: dizziness, lightheadedness Eyes:  vision changes, double vision, photophobia Ears:  change in hearing, ear pain, ear discharge, ear ringing Nose:  sneezing, runny nose Mouth/Throat: sore throat, voice change Neck:  pain, stiffness Respiratory: dyspnea at rest, dyspnea on exertion, wheezing, cough, sputum production Cardiovascular:   chest pain, palpitations Gastrointestinal:  nausea, vomiting Urinary: dysuria, urinary frequency, nocturia, malodorous urine, difficulty initiating flow, slow urine stream 
Genital (M): penile discharge, ulcerations, rashes Musculoskeletal:  joint pain, back pain Psychiatric: insomnia, anxiety Endocrine: cold intolerance, heat intolerance Hematologic: easy bruising, easy bleeding Dermatologic: Itching, rash Physical Assessment:  
 
Visit Vitals /80 (BP 1 Location: Left arm, BP Patient Position: Sitting) Comment: manual  
Pulse 62  
 Temp 97.6 °F (36.4 °C) (Oral) Resp 15 Ht 6' 1\" (1.854 m) Wt 236 lb (107 kg) SpO2 98% BMI 31.14 kg/m² General:   Well-groomed, well-nourished, in no distress, pleasant, appropriate and conversant. Eyes:    PERRL, conjunctiva clear Mouth:  MMM, good dentition, oropharynx WNL without membranes, exudates, petechiae or ulcers Cardiovascular:   RRR, no MRG. Pulmonary:   Lungs clear bilaterally. Normal respiratory effort. Abdomen:   Abdomen soft, NT, ND Extremities:   No edema, LEs warm and well-perfused. Neuro:   Alert and oriented, no focal deficits. No facial asymmetry noted. Skin:    No rash or jaundice MSK:   Normal ROM, 5/5 muscle strength Psych:  No pressured speech or abnormal thought content Lab Results Component Value Date/Time Hemoglobin A1c 8.0 (H) 02/27/2019 09:55 AM  
 Hemoglobin A1c 7.3 (H) 11/09/2018 10:19 AM  
 Hemoglobin A1c 6.7 (H) 08/09/2018 10:19 AM  
 Glucose 164 (H) 02/27/2019 09:55 AM  
 Microalbumin/Creat ratio (mg/g creat) 6 05/07/2018 09:59 AM  
 Microalbumin,urine random 0.80 05/07/2018 09:59 AM  
 LDL, calculated Cannot be calculated 02/27/2019 09:55 AM  
 Creatinine 1.33 (H) 02/27/2019 09:55 AM  
   
 
Assessment/Plan & Orders: ICD-10-CM ICD-9-CM 1. Essential hypertension I10 401.9 2. Controlled type 2 diabetes mellitus without complication, with long-term current use of insulin (HCC) E11.9 250.00 MICROALBUMIN, UR, RAND W/ MICROALB/CREAT RATIO  
 Z79.4 V58.67 AMB POC HEMOGLOBIN A1C  
   LIPID PANEL W/ REFLX DIRECT LDL SITagliptin-metFORMIN (JANUMET) 50-1,000 mg per tablet  DIABETES FOOT EXAM  
3. Obesity, Class I, BMI 30-34.9 E66.9 278.00   
4. Screening for depression Z13.31 V79.0 CT DEPRESSION SCREEN ANNUAL  
 
 Colon cancer: Colonoscopy u Influenza vaccine: complete Pneumococcal vaccine: due at age 74 Tdap: complete Herpes Zoster vaccine: due at age 63 Hep B vaccine: not indicated (liver dz, DM 19-59) Weight:  Body mass index is 31.14 kg/m². Discussed the patient's BMI with him. The BMI follow up plan is as follows: diet and exercise Prostate cancer:  PSA *** 
AAA:  One-time abdominal US if current or former smoker aged 72 to 76 years Osteoporosis:  No indication for dexa scan Follow-up and Dispositions · Return in about 5 weeks (around 7/2/2019) for CPE, Hypertension, Weight management, Diabetes mellitus, Go over lab/imaging results, Hyperlipidemia. *Patient verbalized understanding and agreement with the plan. Patient was given an after-visit summary. Fabricio Jackson. Asa Hatfield MD - Internal Medicine 5/28/2019, 10:07 AM 
[facility] [id number] [place] [address], [address] Phone [phone] Fax [phone]

## 2019-05-28 NOTE — PROGRESS NOTES
Martine Davalos is a 48 y.o.  male presents today for office visit for follow up. Pt would also like to discuss DM. Pt is not fasting. Pt is in Room # 3      1. Have you been to the ER, urgent care clinic since your last visit? Hospitalized since your last visit? No    2. Have you seen or consulted any other health care providers outside of the [address] [place] [address] since your last visit? Include any pap smears or colon screening. No    Upcoming Appts  none    Health Maintenance reviewed      VORB: No orders of the defined types were placed in this encounter.   Ariane Farnsworth LPN

## 2019-08-13 ENCOUNTER — OFFICE VISIT (OUTPATIENT)
Dept: FAMILY MEDICINE CLINIC | Facility: CLINIC | Age: 54
End: 2019-08-13

## 2019-08-13 VITALS
HEIGHT: 73 IN | OXYGEN SATURATION: 98 % | RESPIRATION RATE: 15 BRPM | TEMPERATURE: 97 F | WEIGHT: 236 LBS | DIASTOLIC BLOOD PRESSURE: 90 MMHG | SYSTOLIC BLOOD PRESSURE: 140 MMHG | HEART RATE: 61 BPM | BODY MASS INDEX: 31.28 KG/M2

## 2019-08-13 DIAGNOSIS — I10 ESSENTIAL HYPERTENSION: ICD-10-CM

## 2019-08-13 DIAGNOSIS — E78.1 HYPERTRIGLYCERIDEMIA: ICD-10-CM

## 2019-08-13 DIAGNOSIS — E11.9 TYPE 2 DIABETES MELLITUS WITHOUT COMPLICATION, WITH LONG-TERM CURRENT USE OF INSULIN (HCC): ICD-10-CM

## 2019-08-13 DIAGNOSIS — Z79.4 TYPE 2 DIABETES MELLITUS WITHOUT COMPLICATION, WITH LONG-TERM CURRENT USE OF INSULIN (HCC): ICD-10-CM

## 2019-08-13 DIAGNOSIS — Z12.5 SCREENING FOR PROSTATE CANCER: ICD-10-CM

## 2019-08-13 DIAGNOSIS — Z00.00 ROUTINE GENERAL MEDICAL EXAMINATION AT A HEALTH CARE FACILITY: Primary | ICD-10-CM

## 2019-08-13 PROBLEM — E78.5 HYPERLIPIDEMIA: Status: RESOLVED | Noted: 2018-02-07 | Resolved: 2019-08-13

## 2019-08-13 RX ORDER — ASPIRIN 81 MG/1
81 TABLET ORAL DAILY
Qty: 90 TAB | Refills: 3 | Status: SHIPPED | OUTPATIENT
Start: 2019-08-13 | End: 2021-10-29

## 2019-08-13 RX ORDER — PRAVASTATIN SODIUM 20 MG/1
20 TABLET ORAL
Qty: 90 TAB | Refills: 3 | Status: SHIPPED | OUTPATIENT
Start: 2019-08-13 | End: 2020-09-28

## 2019-08-13 NOTE — PROGRESS NOTES
Heath Shelton is a 48 y.o.  male presents today for office visit for follow up. Pt would also like to discuss DM. Pt is not fasting. Pt is in Room # 3      1. Have you been to the ER, urgent care clinic since your last visit? Hospitalized since your last visit? No    2. Have you seen or consulted any other health care providers outside of the [address] [place] [address] since your last visit? Include any pap smears or colon screening.  No    Upcoming Appts  none    Health Maintenance reviewed       VORB:   Orders Placed This Encounter    PROSTATE SPECIFIC AG    MyChart Blood Pressure Flowsheet    pravastatin (PRAVACHOL) 20 mg tablet    aspirin delayed-release 81 mg tablet   Radha Nation LPN

## 2019-08-13 NOTE — PATIENT INSTRUCTIONS
Pt to check BP at home  Decrease lantus by 3 units every 3 days   If BS fall above 200, can stop decreasing lantus

## 2019-09-18 DIAGNOSIS — I10 ESSENTIAL HYPERTENSION: ICD-10-CM

## 2019-09-18 RX ORDER — LOSARTAN POTASSIUM AND HYDROCHLOROTHIAZIDE 25; 100 MG/1; MG/1
1 TABLET ORAL DAILY
Qty: 90 TAB | Refills: 3 | Status: SHIPPED | OUTPATIENT
Start: 2019-09-18 | End: 2020-11-05

## 2019-12-19 ENCOUNTER — OFFICE VISIT (OUTPATIENT)
Dept: FAMILY MEDICINE CLINIC | Facility: CLINIC | Age: 54
End: 2019-12-19

## 2019-12-19 VITALS
SYSTOLIC BLOOD PRESSURE: 140 MMHG | WEIGHT: 241 LBS | HEIGHT: 73 IN | DIASTOLIC BLOOD PRESSURE: 100 MMHG | HEART RATE: 66 BPM | TEMPERATURE: 97 F | RESPIRATION RATE: 17 BRPM | OXYGEN SATURATION: 97 % | BODY MASS INDEX: 31.94 KG/M2

## 2019-12-19 DIAGNOSIS — Z79.4 TYPE 2 DIABETES MELLITUS WITHOUT COMPLICATION, WITH LONG-TERM CURRENT USE OF INSULIN (HCC): Primary | ICD-10-CM

## 2019-12-19 DIAGNOSIS — E11.9 TYPE 2 DIABETES MELLITUS WITHOUT COMPLICATION, WITH LONG-TERM CURRENT USE OF INSULIN (HCC): Primary | ICD-10-CM

## 2019-12-19 DIAGNOSIS — I10 ESSENTIAL HYPERTENSION: ICD-10-CM

## 2019-12-19 DIAGNOSIS — Z23 ENCOUNTER FOR IMMUNIZATION: ICD-10-CM

## 2019-12-19 DIAGNOSIS — E78.1 HYPERTRIGLYCERIDEMIA: ICD-10-CM

## 2019-12-19 DIAGNOSIS — E66.9 OBESITY, CLASS I, BMI 30-34.9: ICD-10-CM

## 2019-12-19 LAB — HBA1C MFR BLD HPLC: 8.7 %

## 2019-12-19 NOTE — PROGRESS NOTES
Maxim Mahoney is a 48 y.o.  male presents today for office visit for follow up. Pt would also like to discuss DM. Pt is not fasting. Pt is in Room # 3      1. Have you been to the ER, urgent care clinic since your last visit? Hospitalized since your last visit? No    2. Have you seen or consulted any other health care providers outside of the [address] [place] [address] since your last visit? Include any pap smears or colon screening. No    Upcoming Appts  none    Health Maintenance reviewed      VORB: No orders of the defined types were placed in this encounter. Alannah Thompson, [address] [facility], [place]         Maxim Mahoney is a 48 y.o. male who presents for routine immunizations. He denies any symptoms , reactions or allergies that would exclude them from being immunized today. Risks and adverse reactions were discussed and the VIS was given to them. All questions were addressed. He was observed for 10 min post injection. There were no reactions observed.     Catherine Zayas LPN

## 2019-12-19 NOTE — PROGRESS NOTES
Internal Medicine Progress Note    Today's Date:  2019   Patient:  Nik Larson  Patient :  1965    Subjective:     Chief Complaint   Patient presents with    Immunization/Injection     flu    Diabetes     did not check BS this morning     Hypertension    Weight Management      HTN  This is a chronic problem. BP is not controlled. Pt takes losartan-hctz.  Pt reports compliance with this medication.      Hyperlipidemia/Obesity Class I  This is a chronic problem. HDL and triglycerides are not at goal. Pt takes pravastatin. Pt gained weight since the last visit. Lab Results   Component Value Date/Time    Cholesterol, total 85 2019 09:55 AM    HDL Cholesterol 26 (L) 2019 09:55 AM    LDL, calculated Cannot be calculated 2019 09:55 AM    VLDL, calculated  2019 09:55 AM     Calculation not valid with this patient's other Lipid values. Triglyceride 324 (H) 2019 09:55 AM    CHOL/HDL Ratio 3.3 2019 09:55 AM      DM Type 2  This is a chronic problem. This is at goal. He is taking janumet and lantus. Pt reports dietary indiscretion recently. 3 most recent PHQ Screens 2019   Little interest or pleasure in doing things Not at all   Feeling down, depressed, irritable, or hopeless Not at all   Total Score PHQ 2 0     Past Medical History:   Diagnosis Date    Diabetes ([facility] [address] 2016    Type II    Gout 2008    Hypercholesterolemia     Hypertension     Obesity, Class I, BMI 30-34.9 2019     History reviewed. No pertinent surgical history. reports that he has never smoked. He has never used smokeless tobacco. He reports current alcohol use. He reports that he does not use drugs.   Family History   Problem Relation Age of Onset    Cancer Mother 54    Diabetes Father         Type II    No Known Problems Sister     No Known Problems Maternal Grandmother     Heart Attack Maternal Grandfather     Diabetes Paternal Grandmother     Diabetes Paternal Grandfather     Other Daughter     Other Daughter      No Known Allergies     Review of Systems   CV:      chest pain, palpitations  PULM:  SOB, wheezing, cough, sputum production    Current Outpatient Meds      Current Outpatient Medications on File Prior to Visit   Medication Sig Dispense Refill    losartan-hydroCHLOROthiazide (HYZAAR) 100-25 mg per tablet Take 1 Tab by mouth daily. Indications: high blood pressure 90 Tab 3    pravastatin (PRAVACHOL) 20 mg tablet Take 1 Tab by mouth nightly. 90 Tab 3    aspirin delayed-release 81 mg tablet Take 1 Tab by mouth daily. 90 Tab 3    SITagliptin-metFORMIN (JANUMET) 50-1,000 mg per tablet Take 1 Tab by mouth two (2) times daily (with meals). 180 Tab 3    LANTUS SOLOSTAR U-100 INSULIN 100 unit/mL (3 mL) inpn INJECT 25 UNITS BENEATH THE SKIN ONCE A DAY (Patient taking differently: INJECT 21 UNITS qAM) 5 Adjustable Dose Pre-filled Pen Syringe 5    FLAXSEED OIL (OMEGA 3 PO) Take  by mouth. No current facility-administered medications on file prior to visit. Objective:       Visit Vitals  BP (!) 140/100 (BP 1 Location: Left arm, BP Patient Position: Sitting)   Pulse 66   Temp 97 °F (36.1 °C) (Oral)   Resp 17   Ht 6' 1\" (1.854 m)   Wt 241 lb (109.3 kg)   SpO2 97%   BMI 31.80 kg/m²     General:   Well-nourished, well-groomed, pleasant, alert, in no acute distress  Head:  Normocephalic, atraumatic  Ears:  External ears WNL  Nose:  External nares WNL  Psych:  No pressured speech, no abnormal thought content    Last Point of Care HGB A1C  Hemoglobin A1c (POC)   Date Value Ref Range Status   12/19/2019 8.7 % Final      Assessment/Plan & Orders:         ICD-10-CM ICD-9-CM    1. Type 2 diabetes mellitus without complication, with long-term current use of insulin (HCC) E11.9 250.00 AMB POC HEMOGLOBIN A1C    Z79.4 V58.67 CBC WITH AUTOMATED DIFF   2. Essential hypertension I10 401.9    3. Hypertriglyceridemia E78.1 272.1    4.  Obesity, Class I, BMI 30-34.9 E66.9 278.00    5. Encounter for immunization Z23 V03.89 INFLUENZA VIRUS VAC QUAD,SPLIT,PRESV FREE SYRINGE IM     Information given on ketogenic/IF diet with exercise  Improve diet    Follow-up and Dispositions    · Return in about 4 weeks (around 1/16/2020) for Weight management, Hypertension, Hyperlipidemia, Diabetes mellitus. *Patient verbalized understanding and agreement with the plan. Patient was given an after-visit summary. Polly Liu.  Vern Fontenot MD - Internal Medicine  12/28/2019, 11:17 AM  [facility]  [address] [place], [address]  Phone [phone]  Fax [phone]

## 2020-04-06 ENCOUNTER — PATIENT MESSAGE (OUTPATIENT)
Dept: FAMILY MEDICINE CLINIC | Facility: CLINIC | Age: 55
End: 2020-04-06

## 2020-04-06 ENCOUNTER — TELEPHONE (OUTPATIENT)
Dept: FAMILY MEDICINE CLINIC | Facility: CLINIC | Age: 55
End: 2020-04-06

## 2020-04-06 DIAGNOSIS — Z79.4 TYPE 2 DIABETES MELLITUS WITHOUT COMPLICATION, WITH LONG-TERM CURRENT USE OF INSULIN (HCC): Primary | ICD-10-CM

## 2020-04-06 DIAGNOSIS — E11.9 TYPE 2 DIABETES MELLITUS WITHOUT COMPLICATION, WITH LONG-TERM CURRENT USE OF INSULIN (HCC): Primary | ICD-10-CM

## 2020-04-06 DIAGNOSIS — E78.1 HYPERTRIGLYCERIDEMIA: ICD-10-CM

## 2020-04-14 ENCOUNTER — TELEPHONE (OUTPATIENT)
Dept: FAMILY MEDICINE CLINIC | Facility: CLINIC | Age: 55
End: 2020-04-14

## 2020-07-06 ENCOUNTER — E-VISIT (OUTPATIENT)
Dept: FAMILY MEDICINE CLINIC | Facility: CLINIC | Age: 55
End: 2020-07-06

## 2020-07-09 DIAGNOSIS — Z79.4 CONTROLLED TYPE 2 DIABETES MELLITUS WITHOUT COMPLICATION, WITH LONG-TERM CURRENT USE OF INSULIN (HCC): ICD-10-CM

## 2020-07-09 DIAGNOSIS — E11.21 TYPE 2 DIABETES WITH NEPHROPATHY (HCC): ICD-10-CM

## 2020-07-09 DIAGNOSIS — E11.9 CONTROLLED TYPE 2 DIABETES MELLITUS WITHOUT COMPLICATION, WITH LONG-TERM CURRENT USE OF INSULIN (HCC): ICD-10-CM

## 2020-07-13 RX ORDER — INSULIN GLARGINE 100 [IU]/ML
25 INJECTION, SOLUTION SUBCUTANEOUS DAILY
Qty: 7.5 ML | Refills: 0 | Status: SHIPPED | OUTPATIENT
Start: 2020-07-13 | End: 2020-08-12

## 2020-07-13 NOTE — TELEPHONE ENCOUNTER
Requested Prescriptions     Pending Prescriptions Disp Refills    Lantus Solostar U-100 Insulin 100 unit/mL (3 mL) inpn 7.5 mL 0     Si Units by SubCUTAneous route daily for 30 days. INJECT 25 UNITS BENEATH THE SKIN ONCE A DAY    SITagliptin-metFORMIN (JANUMET) 50-1,000 mg per tablet 60 Tab 0     Sig: Take 1 Tab by mouth two (2) times daily (with meals).      Future Appointments   Date Time Provider Priya Jacobson   2020 12:30 PM Ania Gaffney PA-C [address] [place] [address]

## 2020-07-14 ENCOUNTER — HOSPITAL ENCOUNTER (OUTPATIENT)
Dept: LAB | Age: 55
Discharge: HOME | End: 2020-07-14
Payer: COMMERCIAL

## 2020-07-14 DIAGNOSIS — E11.9 TYPE 2 DIABETES MELLITUS WITHOUT COMPLICATION, WITH LONG-TERM CURRENT USE OF INSULIN (HCC): ICD-10-CM

## 2020-07-14 DIAGNOSIS — E78.1 HYPERTRIGLYCERIDEMIA: ICD-10-CM

## 2020-07-14 DIAGNOSIS — Z79.4 TYPE 2 DIABETES MELLITUS WITHOUT COMPLICATION, WITH LONG-TERM CURRENT USE OF INSULIN (HCC): ICD-10-CM

## 2020-07-14 LAB
ALBUMIN SERPL-MCNC: 4.3 G/DL (ref 3.4–5)
ALBUMIN/GLOB SERPL: 1.4 {RATIO} (ref 0.8–1.7)
ALP SERPL-CCNC: 52 U/L (ref 45–117)
ALT SERPL-CCNC: 59 U/L (ref 16–61)
ANION GAP SERPL CALC-SCNC: 6 MMOL/L (ref 3–18)
AST SERPL-CCNC: 42 U/L (ref 10–38)
BASOPHILS # BLD: 0 K/UL (ref 0–0.1)
BASOPHILS NFR BLD: 1 % (ref 0–2)
BILIRUB SERPL-MCNC: 0.3 MG/DL (ref 0.2–1)
BUN SERPL-MCNC: 16 MG/DL (ref 7–18)
BUN/CREAT SERPL: 12 (ref 12–20)
CALCIUM SERPL-MCNC: 9.2 MG/DL (ref 8.5–10.1)
CHLORIDE SERPL-SCNC: 105 MMOL/L (ref 100–111)
CHOLEST SERPL-MCNC: 141 MG/DL
CO2 SERPL-SCNC: 30 MMOL/L (ref 21–32)
CREAT SERPL-MCNC: 1.29 MG/DL (ref 0.6–1.3)
DIFFERENTIAL METHOD BLD: ABNORMAL
EOSINOPHIL # BLD: 0.1 K/UL (ref 0–0.4)
EOSINOPHIL NFR BLD: 3 % (ref 0–5)
ERYTHROCYTE [DISTWIDTH] IN BLOOD BY AUTOMATED COUNT: 14.5 % (ref 11.6–14.5)
EST. AVERAGE GLUCOSE BLD GHB EST-MCNC: 203 MG/DL
GLOBULIN SER CALC-MCNC: 3 G/DL (ref 2–4)
GLUCOSE SERPL-MCNC: 187 MG/DL (ref 74–99)
HBA1C MFR BLD: 8.7 % (ref 4.2–5.6)
HCT VFR BLD AUTO: 44.2 % (ref 36–48)
HDLC SERPL-MCNC: 22 MG/DL (ref 40–60)
HDLC SERPL: 6.4 {RATIO} (ref 0–5)
HGB BLD-MCNC: 13.8 G/DL (ref 13–16)
LDLC SERPL CALC-MCNC: ABNORMAL MG/DL (ref 0–100)
LIPID PROFILE,FLP: ABNORMAL
LYMPHOCYTES # BLD: 1.8 K/UL (ref 0.9–3.6)
LYMPHOCYTES NFR BLD: 45 % (ref 21–52)
MCH RBC QN AUTO: 27.9 PG (ref 24–34)
MCHC RBC AUTO-ENTMCNC: 31.2 G/DL (ref 31–37)
MCV RBC AUTO: 89.3 FL (ref 74–97)
MONOCYTES # BLD: 0.3 K/UL (ref 0.05–1.2)
MONOCYTES NFR BLD: 7 % (ref 3–10)
NEUTS SEG # BLD: 1.8 K/UL (ref 1.8–8)
NEUTS SEG NFR BLD: 44 % (ref 40–73)
PLATELET # BLD AUTO: 211 K/UL (ref 135–420)
PMV BLD AUTO: 11.1 FL (ref 9.2–11.8)
POTASSIUM SERPL-SCNC: 4.4 MMOL/L (ref 3.5–5.5)
PROT SERPL-MCNC: 7.3 G/DL (ref 6.4–8.2)
RBC # BLD AUTO: 4.95 M/UL (ref 4.7–5.5)
SODIUM SERPL-SCNC: 141 MMOL/L (ref 136–145)
TRIGL SERPL-MCNC: 787 MG/DL (ref ?–150)
VLDLC SERPL CALC-MCNC: ABNORMAL MG/DL
WBC # BLD AUTO: 4 K/UL (ref 4.6–13.2)

## 2020-07-14 PROCEDURE — 80053 COMPREHEN METABOLIC PANEL: CPT

## 2020-07-14 PROCEDURE — 85025 COMPLETE CBC W/AUTO DIFF WBC: CPT

## 2020-07-14 PROCEDURE — 80061 LIPID PANEL: CPT

## 2020-07-14 PROCEDURE — 83036 HEMOGLOBIN GLYCOSYLATED A1C: CPT

## 2020-07-23 ENCOUNTER — VIRTUAL VISIT (OUTPATIENT)
Dept: FAMILY MEDICINE CLINIC | Facility: CLINIC | Age: 55
End: 2020-07-23

## 2020-07-23 NOTE — PROGRESS NOTES
Pt did not respond to msgs sent to connect fro visit until an hour after his appt.  Advised pt he will need to reschedule

## 2020-08-31 ENCOUNTER — PATIENT MESSAGE (OUTPATIENT)
Dept: FAMILY MEDICINE CLINIC | Facility: CLINIC | Age: 55
End: 2020-08-31

## 2020-11-05 ENCOUNTER — PATIENT MESSAGE (OUTPATIENT)
Dept: FAMILY MEDICINE CLINIC | Age: 55
End: 2020-11-05

## 2021-02-18 DIAGNOSIS — I10 ESSENTIAL HYPERTENSION: ICD-10-CM

## 2021-02-19 RX ORDER — LOSARTAN POTASSIUM AND HYDROCHLOROTHIAZIDE 25; 100 MG/1; MG/1
TABLET ORAL
Qty: 90 TAB | Refills: 0 | OUTPATIENT
Start: 2021-02-19